# Patient Record
Sex: MALE | Race: WHITE | Employment: OTHER | URBAN - METROPOLITAN AREA
[De-identification: names, ages, dates, MRNs, and addresses within clinical notes are randomized per-mention and may not be internally consistent; named-entity substitution may affect disease eponyms.]

---

## 2019-05-08 ENCOUNTER — HOSPITAL ENCOUNTER (EMERGENCY)
Age: 64
Discharge: HOME | End: 2019-05-08
Attending: EMERGENCY MEDICINE
Payer: MEDICARE

## 2019-05-08 VITALS
WEIGHT: 215 LBS | BODY MASS INDEX: 30.1 KG/M2 | DIASTOLIC BLOOD PRESSURE: 64 MMHG | OXYGEN SATURATION: 98 % | HEIGHT: 71 IN | HEART RATE: 96 BPM | RESPIRATION RATE: 14 BRPM | TEMPERATURE: 98.1 F | SYSTOLIC BLOOD PRESSURE: 129 MMHG

## 2019-05-08 DIAGNOSIS — I95.9 HYPOTENSION, UNSPECIFIED HYPOTENSION TYPE: ICD-10-CM

## 2019-05-08 DIAGNOSIS — T78.40XA ALLERGIC REACTION, INITIAL ENCOUNTER: Primary | ICD-10-CM

## 2019-05-08 PROCEDURE — 6360000002 HC RX W HCPCS

## 2019-05-08 PROCEDURE — 6370000000 HC RX 637 (ALT 250 FOR IP): Performed by: EMERGENCY MEDICINE

## 2019-05-08 PROCEDURE — 96372 THER/PROPH/DIAG INJ SC/IM: CPT

## 2019-05-08 PROCEDURE — 99282 EMERGENCY DEPT VISIT SF MDM: CPT

## 2019-05-08 RX ORDER — DIPHENHYDRAMINE HCL 25 MG
25 CAPSULE ORAL EVERY 6 HOURS PRN
Qty: 20 CAPSULE | Refills: 0 | Status: ON HOLD | OUTPATIENT
Start: 2019-05-08 | End: 2019-05-12 | Stop reason: SDUPTHER

## 2019-05-08 RX ORDER — FAMOTIDINE 20 MG/1
20 TABLET, FILM COATED ORAL ONCE
Status: COMPLETED | OUTPATIENT
Start: 2019-05-08 | End: 2019-05-08

## 2019-05-08 RX ORDER — DIPHENHYDRAMINE HCL 25 MG
25 TABLET ORAL ONCE
Status: COMPLETED | OUTPATIENT
Start: 2019-05-08 | End: 2019-05-08

## 2019-05-08 RX ORDER — ASPIRIN 325 MG
325 TABLET ORAL 2 TIMES DAILY
Status: ON HOLD | COMMUNITY
End: 2019-05-12 | Stop reason: HOSPADM

## 2019-05-08 RX ORDER — HYDROCHLOROTHIAZIDE 25 MG/1
25 TABLET ORAL DAILY
Status: ON HOLD | COMMUNITY
End: 2019-05-12 | Stop reason: HOSPADM

## 2019-05-08 RX ORDER — ONDANSETRON 4 MG/1
4 TABLET, ORALLY DISINTEGRATING ORAL ONCE
Status: COMPLETED | OUTPATIENT
Start: 2019-05-08 | End: 2019-05-08

## 2019-05-08 RX ORDER — LEVOTHYROXINE SODIUM 0.07 MG/1
75 TABLET ORAL DAILY
COMMUNITY
End: 2021-05-19 | Stop reason: SDUPTHER

## 2019-05-08 RX ORDER — EPINEPHRINE 1 MG/ML
0.4 INJECTION, SOLUTION, CONCENTRATE INTRAVENOUS ONCE
Status: COMPLETED | OUTPATIENT
Start: 2019-05-08 | End: 2019-05-08

## 2019-05-08 RX ORDER — ONDANSETRON 4 MG/1
4 TABLET, ORALLY DISINTEGRATING ORAL EVERY 8 HOURS PRN
Qty: 10 TABLET | Refills: 0 | Status: ON HOLD | OUTPATIENT
Start: 2019-05-08 | End: 2019-05-12 | Stop reason: HOSPADM

## 2019-05-08 RX ORDER — OXYCODONE HYDROCHLORIDE 5 MG/1
5 TABLET ORAL EVERY 4 HOURS PRN
COMMUNITY
End: 2019-05-09 | Stop reason: ALTCHOICE

## 2019-05-08 RX ORDER — PREDNISONE 50 MG/1
50 TABLET ORAL DAILY
Qty: 5 TABLET | Refills: 0 | Status: ON HOLD | OUTPATIENT
Start: 2019-05-08 | End: 2019-05-12 | Stop reason: SDUPTHER

## 2019-05-08 RX ORDER — FAMOTIDINE 20 MG/1
20 TABLET, FILM COATED ORAL 2 TIMES DAILY
Qty: 10 TABLET | Refills: 0 | Status: SHIPPED | OUTPATIENT
Start: 2019-05-08 | End: 2019-05-09 | Stop reason: ALTCHOICE

## 2019-05-08 RX ORDER — LISINOPRIL 40 MG/1
40 TABLET ORAL DAILY
Status: ON HOLD | COMMUNITY
End: 2019-05-12 | Stop reason: HOSPADM

## 2019-05-08 RX ADMIN — EPINEPHRINE 0.4 MG: 1 INJECTION INTRAMUSCULAR; INTRAVENOUS; SUBCUTANEOUS at 07:59

## 2019-05-08 RX ADMIN — DIPHENHYDRAMINE HCL 25 MG: 25 TABLET ORAL at 07:44

## 2019-05-08 RX ADMIN — EPINEPHRINE 0.4 MG: 1 INJECTION, SOLUTION, CONCENTRATE INTRAVENOUS at 07:59

## 2019-05-08 RX ADMIN — FAMOTIDINE 20 MG: 20 TABLET ORAL at 07:44

## 2019-05-08 RX ADMIN — ONDANSETRON 4 MG: 4 TABLET, ORALLY DISINTEGRATING ORAL at 07:58

## 2019-05-08 SDOH — HEALTH STABILITY: MENTAL HEALTH: HOW OFTEN DO YOU HAVE A DRINK CONTAINING ALCOHOL?: NEVER

## 2019-05-08 NOTE — ED NOTES
Pt states itching is better, rash is less rash but still present to call areas noted before, Pt milan wilkins /64. Reported to Dr. Tony Desir Pt is on a continuous pulse oximetry and  on cycling blood pressure. Fall risk precautions in place, call light in reach, bed side table within reach, bed alarm on, will continue to monitor.          Ladonna Eaton, [address] [place] [address]  05/08/19 0644

## 2019-05-08 NOTE — ED PROVIDER NOTES
HYDROCHLOROTHIAZIDE (HYDRODIURIL) 25 MG TABLET    Take 25 mg by mouth daily    LEVOTHYROXINE (SYNTHROID) 75 MCG TABLET    Take 75 mcg by mouth Daily    LISINOPRIL (PRINIVIL;ZESTRIL) 40 MG TABLET    Take 40 mg by mouth daily    OXYCODONE (ROXICODONE) 5 MG IMMEDIATE RELEASE TABLET    Take 5 mg by mouth every 4 hours as needed for Pain. ALLERGIES     Patient has no known allergies. FAMILY HISTORY     History reviewed. No pertinent family history.        SOCIAL HISTORY       Social History     Socioeconomic History    Marital status:      Spouse name: None    Number of children: None    Years of education: None    Highest education level: None   Occupational History    None   Social Needs    Financial resource strain: None    Food insecurity:     Worry: None     Inability: None    Transportation needs:     Medical: None     Non-medical: None   Tobacco Use    Smoking status: Never Smoker    Smokeless tobacco: Never Used   Substance and Sexual Activity    Alcohol use: Never     Frequency: Never    Drug use: None    Sexual activity: None   Lifestyle    Physical activity:     Days per week: None     Minutes per session: None    Stress: None   Relationships    Social connections:     Talks on phone: None     Gets together: None     Attends Voodoo service: None     Active member of club or organization: None     Attends meetings of clubs or organizations: None     Relationship status: None    Intimate partner violence:     Fear of current or ex partner: None     Emotionally abused: None     Physically abused: None     Forced sexual activity: None   Other Topics Concern    None   Social History Narrative    None       SCREENINGS      @FLOW([id number])@      PHYSICAL EXAM    (up to 7 for level 4, 8 or more for level 5)     ED Triage Vitals [05/08/19 0724]   BP Temp Temp Source Pulse Resp SpO2 Height Weight   115/70 98.1 °F (36.7 °C) Infrared 91 -- 100 % 5' 11\" (1.803 m) 215 lb (97.5 kg) CARE TIME   Total Critical Care time was 31 minutes, excluding separatelyreportable procedures. There was a high probability ofclinically significant/life threatening deterioration in the patient's condition which required my urgent intervention. Allergic reaction causing hemodynamic related issues        PROCEDURES:  Unless otherwise noted below, none       Procedures    FINAL IMPRESSION      1. Allergic reaction, initial encounter    2. Hypotension, unspecified hypotension type          DISPOSITION/PLAN   DISPOSITION Decision To Discharge 05/08/2019 08:53:27 AM      PATIENT REFERRED TO:  [facility] [address] [id number]  [phone]    In 1 day        DISCHARGE MEDICATIONS:  New Prescriptions    DIPHENHYDRAMINE (BENADRYL) 25 MG CAPSULE    Take 1 capsule by mouth every 6 hours as needed for Itching    FAMOTIDINE (PEPCID) 20 MG TABLET    Take 1 tablet by mouth 2 times daily for 5 days    ONDANSETRON (ZOFRAN ODT) 4 MG DISINTEGRATING TABLET    Take 1 tablet by mouth every 8 hours as needed for Nausea Let dissolve in mouth.     PREDNISONE (DELTASONE) 50 MG TABLET    Take 1 tablet by mouth daily for 5 days          (Please note that portions of this note were completed with a voice recognition program.  Efforts weremade to edit the dictations but occasionally words are mis-transcribed.)    Cliff Orlando III, DO (electronically signed)  Attending Emergency Physician         Mike Gray III, DO  05/08/19 4759

## 2019-05-09 ENCOUNTER — HOSPITAL ENCOUNTER (INPATIENT)
Age: 64
LOS: 3 days | Discharge: HOME | DRG: 916 | End: 2019-05-12
Attending: EMERGENCY MEDICINE | Admitting: INTERNAL MEDICINE
Payer: MEDICARE

## 2019-05-09 ENCOUNTER — APPOINTMENT (OUTPATIENT)
Dept: GENERAL RADIOLOGY | Age: 64
DRG: 916 | End: 2019-05-09
Payer: MEDICARE

## 2019-05-09 DIAGNOSIS — E87.1 HYPONATREMIA: ICD-10-CM

## 2019-05-09 DIAGNOSIS — Z78.9 FAILURE OF OUTPATIENT TREATMENT: ICD-10-CM

## 2019-05-09 DIAGNOSIS — R21 RASH: Primary | ICD-10-CM

## 2019-05-09 DIAGNOSIS — I95.9 HYPOTENSION, UNSPECIFIED HYPOTENSION TYPE: ICD-10-CM

## 2019-05-09 DIAGNOSIS — E66.9 OBESITY (BMI 30-39.9): ICD-10-CM

## 2019-05-09 DIAGNOSIS — T14.8XXA BLOOD BLISTER: ICD-10-CM

## 2019-05-09 DIAGNOSIS — L50.9 URTICARIA: ICD-10-CM

## 2019-05-09 PROBLEM — E03.9 HYPOTHYROID: Status: ACTIVE | Noted: 2019-05-09

## 2019-05-09 PROBLEM — T78.40XA ALLERGIC REACTION CAUSED BY A DRUG: Status: ACTIVE | Noted: 2019-05-09

## 2019-05-09 PROBLEM — D72.829 LEUKOCYTOSIS: Status: ACTIVE | Noted: 2019-05-09

## 2019-05-09 PROBLEM — T78.40XA ALLERGIC DRUG REACTION: Status: ACTIVE | Noted: 2019-05-09

## 2019-05-09 PROBLEM — D68.9 COAGULOPATHY (HCC): Status: ACTIVE | Noted: 2019-05-09

## 2019-05-09 PROBLEM — I10 HTN (HYPERTENSION): Status: ACTIVE | Noted: 2019-05-09

## 2019-05-09 PROBLEM — E86.0 DEHYDRATION: Status: ACTIVE | Noted: 2019-05-09

## 2019-05-09 PROBLEM — D64.9 ANEMIA: Status: ACTIVE | Noted: 2019-05-09

## 2019-05-09 LAB
A/G RATIO: 0.9 (ref 1.1–2.2)
ALBUMIN SERPL-MCNC: 3.6 G/DL (ref 3.4–5)
ALP BLD-CCNC: 73 U/L (ref 40–129)
ALT SERPL-CCNC: 19 U/L (ref 10–40)
ANION GAP SERPL CALCULATED.3IONS-SCNC: 12 MMOL/L (ref 3–16)
APTT: 27.7 SEC (ref 26–36)
AST SERPL-CCNC: 19 U/L (ref 15–37)
BASOPHILS ABSOLUTE: 0 K/UL (ref 0–0.2)
BASOPHILS RELATIVE PERCENT: 0.1 %
BILIRUB SERPL-MCNC: 1.3 MG/DL (ref 0–1)
BILIRUBIN URINE: NEGATIVE
BLOOD, URINE: NEGATIVE
BUN BLDV-MCNC: 27 MG/DL (ref 7–20)
CALCIUM SERPL-MCNC: 9.2 MG/DL (ref 8.3–10.6)
CHLORIDE BLD-SCNC: 96 MMOL/L (ref 99–110)
CLARITY: CLEAR
CO2: 20 MMOL/L (ref 21–32)
COLOR: YELLOW
CREAT SERPL-MCNC: 0.9 MG/DL (ref 0.8–1.3)
EOSINOPHILS ABSOLUTE: 0 K/UL (ref 0–0.6)
EOSINOPHILS RELATIVE PERCENT: 0 %
GFR AFRICAN AMERICAN: >60
GFR NON-AFRICAN AMERICAN: >60
GLOBULIN: 4.1 G/DL
GLUCOSE BLD-MCNC: 189 MG/DL (ref 70–99)
GLUCOSE URINE: NEGATIVE MG/DL
HCT VFR BLD CALC: 36.9 % (ref 40.5–52.5)
HEMOGLOBIN: 12.3 G/DL (ref 13.5–17.5)
INR BLD: 1.22 (ref 0.86–1.14)
KETONES, URINE: ABNORMAL MG/DL
LACTATE DEHYDROGENASE: 431 U/L (ref 100–190)
LACTIC ACID: 1.9 MMOL/L (ref 0.4–2)
LEUKOCYTE ESTERASE, URINE: NEGATIVE
LYMPHOCYTES ABSOLUTE: 0.5 K/UL (ref 1–5.1)
LYMPHOCYTES RELATIVE PERCENT: 4.7 %
MCH RBC QN AUTO: 31.5 PG (ref 26–34)
MCHC RBC AUTO-ENTMCNC: 33.4 G/DL (ref 31–36)
MCV RBC AUTO: 94.2 FL (ref 80–100)
MICROSCOPIC EXAMINATION: ABNORMAL
MONOCYTES ABSOLUTE: 0.2 K/UL (ref 0–1.3)
MONOCYTES RELATIVE PERCENT: 1.9 %
NEUTROPHILS ABSOLUTE: 10.7 K/UL (ref 1.7–7.7)
NEUTROPHILS RELATIVE PERCENT: 93.3 %
NITRITE, URINE: NEGATIVE
PDW BLD-RTO: 13.3 % (ref 12.4–15.4)
PH UA: 6 (ref 5–8)
PLATELET # BLD: 314 K/UL (ref 135–450)
PMV BLD AUTO: 7.2 FL (ref 5–10.5)
POTASSIUM SERPL-SCNC: 4.2 MMOL/L (ref 3.5–5.1)
PROTEIN UA: NEGATIVE MG/DL
PROTHROMBIN TIME: 13.9 SEC (ref 9.8–13)
RBC # BLD: 3.91 M/UL (ref 4.2–5.9)
SEDIMENTATION RATE, ERYTHROCYTE: 76 MM/HR (ref 0–20)
SODIUM BLD-SCNC: 128 MMOL/L (ref 136–145)
SPECIFIC GRAVITY UA: 1.02 (ref 1–1.03)
TOTAL PROTEIN: 7.7 G/DL (ref 6.4–8.2)
URINE REFLEX TO CULTURE: ABNORMAL
URINE TYPE: ABNORMAL
UROBILINOGEN, URINE: 1 E.U./DL
WBC # BLD: 11.5 K/UL (ref 4–11)

## 2019-05-09 PROCEDURE — 85652 RBC SED RATE AUTOMATED: CPT

## 2019-05-09 PROCEDURE — 1200000000 HC SEMI PRIVATE

## 2019-05-09 PROCEDURE — 2500000003 HC RX 250 WO HCPCS: Performed by: INTERNAL MEDICINE

## 2019-05-09 PROCEDURE — 93971 EXTREMITY STUDY: CPT

## 2019-05-09 PROCEDURE — 80053 COMPREHEN METABOLIC PANEL: CPT

## 2019-05-09 PROCEDURE — 85730 THROMBOPLASTIN TIME PARTIAL: CPT

## 2019-05-09 PROCEDURE — 86140 C-REACTIVE PROTEIN: CPT

## 2019-05-09 PROCEDURE — 36415 COLL VENOUS BLD VENIPUNCTURE: CPT

## 2019-05-09 PROCEDURE — 96361 HYDRATE IV INFUSION ADD-ON: CPT

## 2019-05-09 PROCEDURE — 94760 N-INVAS EAR/PLS OXIMETRY 1: CPT

## 2019-05-09 PROCEDURE — 83605 ASSAY OF LACTIC ACID: CPT

## 2019-05-09 PROCEDURE — 6370000000 HC RX 637 (ALT 250 FOR IP): Performed by: INTERNAL MEDICINE

## 2019-05-09 PROCEDURE — 71046 X-RAY EXAM CHEST 2 VIEWS: CPT

## 2019-05-09 PROCEDURE — 81003 URINALYSIS AUTO W/O SCOPE: CPT

## 2019-05-09 PROCEDURE — 99284 EMERGENCY DEPT VISIT MOD MDM: CPT

## 2019-05-09 PROCEDURE — 6360000002 HC RX W HCPCS: Performed by: PHYSICIAN ASSISTANT

## 2019-05-09 PROCEDURE — 96374 THER/PROPH/DIAG INJ IV PUSH: CPT

## 2019-05-09 PROCEDURE — 87040 BLOOD CULTURE FOR BACTERIA: CPT

## 2019-05-09 PROCEDURE — 6360000002 HC RX W HCPCS: Performed by: INTERNAL MEDICINE

## 2019-05-09 PROCEDURE — 2580000003 HC RX 258: Performed by: INTERNAL MEDICINE

## 2019-05-09 PROCEDURE — 2580000003 HC RX 258: Performed by: PHYSICIAN ASSISTANT

## 2019-05-09 PROCEDURE — 85025 COMPLETE CBC W/AUTO DIFF WBC: CPT

## 2019-05-09 PROCEDURE — 83615 LACTATE (LD) (LDH) ENZYME: CPT

## 2019-05-09 PROCEDURE — 85610 PROTHROMBIN TIME: CPT

## 2019-05-09 PROCEDURE — 82785 ASSAY OF IGE: CPT

## 2019-05-09 RX ORDER — 0.9 % SODIUM CHLORIDE 0.9 %
1000 INTRAVENOUS SOLUTION INTRAVENOUS ONCE
Status: COMPLETED | OUTPATIENT
Start: 2019-05-09 | End: 2019-05-09

## 2019-05-09 RX ORDER — SODIUM CHLORIDE 0.9 % (FLUSH) 0.9 %
10 SYRINGE (ML) INJECTION EVERY 12 HOURS SCHEDULED
Status: DISCONTINUED | OUTPATIENT
Start: 2019-05-09 | End: 2019-05-12 | Stop reason: HOSPADM

## 2019-05-09 RX ORDER — CLOBETASOL PROPIONATE 0.5 MG/G
CREAM TOPICAL 2 TIMES DAILY
Status: DISCONTINUED | OUTPATIENT
Start: 2019-05-09 | End: 2019-05-12 | Stop reason: HOSPADM

## 2019-05-09 RX ORDER — ONDANSETRON 2 MG/ML
4 INJECTION INTRAMUSCULAR; INTRAVENOUS EVERY 6 HOURS PRN
Status: DISCONTINUED | OUTPATIENT
Start: 2019-05-09 | End: 2019-05-12 | Stop reason: HOSPADM

## 2019-05-09 RX ORDER — SODIUM CHLORIDE 9 MG/ML
INJECTION, SOLUTION INTRAVENOUS CONTINUOUS
Status: DISCONTINUED | OUTPATIENT
Start: 2019-05-09 | End: 2019-05-11

## 2019-05-09 RX ORDER — METHYLPREDNISOLONE SODIUM SUCCINATE 40 MG/ML
40 INJECTION, POWDER, LYOPHILIZED, FOR SOLUTION INTRAMUSCULAR; INTRAVENOUS EVERY 6 HOURS
Status: DISCONTINUED | OUTPATIENT
Start: 2019-05-09 | End: 2019-05-12 | Stop reason: HOSPADM

## 2019-05-09 RX ORDER — PANTOPRAZOLE SODIUM 40 MG/1
40 TABLET, DELAYED RELEASE ORAL
Status: DISCONTINUED | OUTPATIENT
Start: 2019-05-10 | End: 2019-05-12 | Stop reason: HOSPADM

## 2019-05-09 RX ORDER — LEVOTHYROXINE SODIUM 0.07 MG/1
75 TABLET ORAL DAILY
Status: DISCONTINUED | OUTPATIENT
Start: 2019-05-10 | End: 2019-05-12 | Stop reason: HOSPADM

## 2019-05-09 RX ORDER — SODIUM CHLORIDE 0.9 % (FLUSH) 0.9 %
10 SYRINGE (ML) INJECTION PRN
Status: DISCONTINUED | OUTPATIENT
Start: 2019-05-09 | End: 2019-05-12 | Stop reason: HOSPADM

## 2019-05-09 RX ORDER — DIPHENHYDRAMINE HYDROCHLORIDE 50 MG/ML
25 INJECTION INTRAMUSCULAR; INTRAVENOUS ONCE
Status: COMPLETED | OUTPATIENT
Start: 2019-05-09 | End: 2019-05-09

## 2019-05-09 RX ORDER — DIPHENHYDRAMINE HYDROCHLORIDE 50 MG/ML
12.5 INJECTION INTRAMUSCULAR; INTRAVENOUS EVERY 6 HOURS
Status: DISCONTINUED | OUTPATIENT
Start: 2019-05-09 | End: 2019-05-12 | Stop reason: HOSPADM

## 2019-05-09 RX ORDER — MAGNESIUM HYDROXIDE/ALUMINUM HYDROXICE/SIMETHICONE 120; 1200; 1200 MG/30ML; MG/30ML; MG/30ML
30 SUSPENSION ORAL EVERY 6 HOURS PRN
Status: DISCONTINUED | OUTPATIENT
Start: 2019-05-09 | End: 2019-05-12 | Stop reason: HOSPADM

## 2019-05-09 RX ORDER — ACETAMINOPHEN 325 MG/1
650 TABLET ORAL EVERY 4 HOURS PRN
Status: DISCONTINUED | OUTPATIENT
Start: 2019-05-09 | End: 2019-05-12 | Stop reason: HOSPADM

## 2019-05-09 RX ADMIN — ENOXAPARIN SODIUM 40 MG: 40 INJECTION SUBCUTANEOUS at 22:35

## 2019-05-09 RX ADMIN — METHYLPREDNISOLONE SODIUM SUCCINATE 40 MG: 40 INJECTION, POWDER, FOR SOLUTION INTRAMUSCULAR; INTRAVENOUS at 22:36

## 2019-05-09 RX ADMIN — SODIUM CHLORIDE: 9 INJECTION, SOLUTION INTRAVENOUS at 22:36

## 2019-05-09 RX ADMIN — FAMOTIDINE 20 MG: 10 INJECTION, SOLUTION INTRAVENOUS at 22:36

## 2019-05-09 RX ADMIN — DIPHENHYDRAMINE HYDROCHLORIDE 25 MG: 50 INJECTION, SOLUTION INTRAMUSCULAR; INTRAVENOUS at 14:47

## 2019-05-09 RX ADMIN — CLOBETASOL PROPIONATE: 0.5 CREAM TOPICAL at 22:36

## 2019-05-09 RX ADMIN — Medication 10 ML: at 22:35

## 2019-05-09 RX ADMIN — DIPHENHYDRAMINE HYDROCHLORIDE 12.5 MG: 50 INJECTION, SOLUTION INTRAMUSCULAR; INTRAVENOUS at 22:36

## 2019-05-09 RX ADMIN — SODIUM CHLORIDE 1000 ML: 9 INJECTION, SOLUTION INTRAVENOUS at 13:10

## 2019-05-09 ASSESSMENT — PAIN SCALES - GENERAL: PAINLEVEL_OUTOF10: 0

## 2019-05-09 ASSESSMENT — ENCOUNTER SYMPTOMS
ABDOMINAL DISTENTION: 0
DIARRHEA: 0
ALLERGIC/IMMUNOLOGIC NEGATIVE: 1
SHORTNESS OF BREATH: 0
COUGH: 0
WHEEZING: 0
NAUSEA: 0
VOMITING: 0
CONSTIPATION: 0
BACK PAIN: 0
ABDOMINAL PAIN: 0
COLOR CHANGE: 0
STRIDOR: 0

## 2019-05-09 NOTE — ED NOTES
Pt returned from vascular. Vitals obtained. IVF resumed. Medicated with benadryl. Patient reports mucous emesis while in vascular. Denies any nausea at this time. Patient remains with scattered hives and bilateral upper extremity hand/arm swelling.       Bernard Ramachandran RN  05/09/19 7435

## 2019-05-09 NOTE — ED NOTES
Pharmacy Medication History Note      List of current medications patient is taking is complete. Source of information: patient    Changes made to medication list:  Medications flagged for removal (include reason, ex. noncompliance):  N/A    Medications removed (include reason, ex. therapy complete or physician discontinued): Famotidine- never started  Oxycodone- therapy complete    Medications added/doses adjusted:  N/A    Other notes (ex. Recent course of antibiotics, Coumadin dosing):  Denies use of other OTC or herbal medications. Last dose times updated. Josselyn Barnhart [facility]    No current facility-administered medications on file prior to encounter. Current Outpatient Medications on File Prior to Encounter   Medication Sig Dispense Refill    levothyroxine (SYNTHROID) 75 MCG tablet Take 75 mcg by mouth Daily      hydrochlorothiazide (HYDRODIURIL) 25 MG tablet Take 25 mg by mouth daily      lisinopril (PRINIVIL;ZESTRIL) 40 MG tablet Take 40 mg by mouth daily      aspirin 325 MG tablet Take 325 mg by mouth 2 times daily      ondansetron (ZOFRAN ODT) 4 MG disintegrating tablet Take 1 tablet by mouth every 8 hours as needed for Nausea Let dissolve in mouth. 10 tablet 0    diphenhydrAMINE (BENADRYL) 25 MG capsule Take 1 capsule by mouth every 6 hours as needed for Itching 20 capsule 0    predniSONE (DELTASONE) 50 MG tablet Take 1 tablet by mouth daily for 5 days 5 tablet 0    [DISCONTINUED] oxyCODONE (ROXICODONE) 5 MG immediate release tablet Take 5 mg by mouth every 4 hours as needed for Pain.       [DISCONTINUED] famotidine (PEPCID) 20 MG tablet Take 1 tablet by mouth 2 times daily for 5 days 10 tablet 0

## 2019-05-09 NOTE — ED NOTES
Pt updated on plan for admission and sterile cockpit. Patient denies any other needs or questions at this time.       Larissa Martínez RN  05/09/19 3726

## 2019-05-09 NOTE — ED PROVIDER NOTES
I personally evaluated and examined the patient in conjunction with the APC and agree with the assessment, treatment plan and disposition of the patient has recorded by the APC. I reviewed pertinent nurse's notes, triage notes, vital signs, past medical history, family and social history, medications, and allergies. Complete review of systems was conducted by the mid-level provider and/or myself. Review of systems is negative except as documented in the history of present illness. Brief HPI: This is a 69-year-old gentleman presents to emergency Department chief complaint of worsening rash. He was seen here yesterday for low blood pressure and rash received steroids, Pepcid, Benadryl and epinephrine. Currently on prednisone, and Benadryl. Reported that he had hives all over his arms and legs. No facial involvement. No facial swelling. Blood pressures been running a little bit lower recently. 13 days ago he had a left total knee replacement. Currently on aspirin twice per day. The reason why he came back today was because of the hives came back and no obvious swelling of the hands with associated bruises of his thumbs. No history of any bleeding disorders. Physical Exam: General: Patient is in no acute distress   Head: Normocephalic, atraumatic, pupils are equal and reactive to light. EOMI. Neck: Neck is supple. No JVD noted. Heart: RRR no murmurs, rubs, or gallops   Lungs: CTA BL   Abdomen: soft, non-tender, non-distended   Extremities: no lower extremity edema. Capillary refill is less than 2 seconds   Skin: Patient has hives noted on his arms, legs and shoulders. He has swelling of his fingers and hands with associated erythema but no induration. He has some blood blisters noted on the tops of his thumbs bilaterally. There is no mucosal involvement in the oropharynx. Pictures are as below. Neuro: CN's 2-12 are grossly intact. No focal neurologic deficit appreciated. Calls placed orthopedics. Spoke with his MA who will relay the information to Dr. Eric Dao. Also spoke with infectious disease disease-Dr. Iwona Santos - would like blood cultures, IgE, ESR, CRP which he will follow-up on. Also would like to hold off on antibiotics. Recommend steroids, Benadryl, Pepcid. This was relayed to the hospitalist.    FINAL IMPRESSION     1. Rash    2. Hypotension, unspecified hypotension type    3. Hyponatremia            Electronically signed by:   Stephanie Saucedo DO  05/09/19 Juan Antonio Velazquez

## 2019-05-09 NOTE — H&P
HOSPITALISTS HISTORY AND PHYSICAL    5/9/2019 6:39 PM    Patient Information:  Xu Brownlee is a 70 y.o. male [id number]  PCP:  Mague Fong (Tel: [phone] )    Chief complaint:    Chief Complaint   Patient presents with    Rash     pt seen here yesterday for hives/ rash following knee surgery- on 4/25/19- states started benadryl and prednisone- pt and wife concerned d/t new blisters on thumb region. called PCP told to come back in        History of Present Illness:  Chava Li is a 70 y.o. male with history of HTN, hypothyroidism, obesity who underwent elective L TKA on 4/25/19 with Dr Lainey Calero at [facility].  Patient was discharged to home on  mg PO bid. In past 3 days, patient began to feel itching at night and then developed rash. Came to ER yesterday morning and was discharged on PO Prednisone, Pecpid, Benadryl and Zofran PRN. Overnight, rash progressed to hands and moving up arms. Noted to have annular rashs on hands. Rash is not painful. There are no oral lesions. Patient took his BP at home 4 times and found SBP was 60s each time. That is what truly prompted ER visit. In ED, arrived with SBP in 90s with acute hyponatremia. No CP, SOB, HA or fevers. No drainage from L knee operation site. No swelling in L knee. Otherwise complete ROS is negative unless listed above. REVIEW OF SYSTEMS:   Pertinent positives as noted in HPI. All other systems were reviewed and are negative. Past Medical History:   has a past medical history of Hypertension and Thyroid disease. Past Surgical History:   has a past surgical history that includes knee surgery. Medications:  No current facility-administered medications on file prior to encounter.       Current Outpatient Medications on File Prior to Encounter   Medication Sig Dispense Refill    levothyroxine (SYNTHROID) 75 MCG tablet Take 75 mcg by mouth Daily      hydrochlorothiazide (HYDRODIURIL) 25 MG tablet Take 25 mg by mouth daily      lisinopril (PRINIVIL;ZESTRIL) 40 MG tablet Take 40 mg by mouth daily      aspirin 325 MG tablet Take 325 mg by mouth 2 times daily      ondansetron (ZOFRAN ODT) 4 MG disintegrating tablet Take 1 tablet by mouth every 8 hours as needed for Nausea Let dissolve in mouth. 10 tablet 0    diphenhydrAMINE (BENADRYL) 25 MG capsule Take 1 capsule by mouth every 6 hours as needed for Itching 20 capsule 0    predniSONE (DELTASONE) 50 MG tablet Take 1 tablet by mouth daily for 5 days 5 tablet 0       Allergies:  No Known Allergies     Social History:  Patient Lives with wife, uses cane   reports that he has never smoked. He has never used smokeless tobacco. He reports that he does not drink alcohol. Family History:  family history includes Diabetes in his father and mother. Physical Exam:  /64   Pulse 92   Temp 98.8 °F (37.1 °C) (Infrared)   Resp 13   Ht 5' 11\" (1.803 m)   Wt 213 lb (96.6 kg)   SpO2 96%   BMI 29.71 kg/m²     General appearance:  Appears comfortable. Well nourished, obese, pleasant  Eyes: Sclera clear, pupils equal  ENT: Moist mucus membranes, no thrush. Trachea midline. Cardiovascular: Regular rhythm, normal S1, S2. No murmur, gallop, rub. No edema in lower extremities  Respiratory: Clear to auscultation bilaterally, no wheeze, good inspiratory effort  Gastrointestinal: Abdomen soft, obese, non-tender, not distended, normal bowel sounds  Musculoskeletal: L TKA site without erythema, tenderness or swelling. Has good ROM  Neurology: Grossly intact. Alert and oriented in time, place and person. No speech or motor deficits  Psychiatry: Appropriate affect. Not agitated  Skin: Annular rash noted on palmar surfaces.   Blanching rash on arms, legs, trunk, back, neck noted  Brisk capillary refill, peripheral pulses palpable   Labs:  CBC:   Lab Results Component Value Date    WBC 11.5 05/09/2019    RBC 3.91 05/09/2019    HGB 12.3 05/09/2019    HCT 36.9 05/09/2019    MCV 94.2 05/09/2019    MCH 31.5 05/09/2019    MCHC 33.4 05/09/2019    RDW 13.3 05/09/2019     05/09/2019    MPV 7.2 05/09/2019     BMP:    Lab Results   Component Value Date     05/09/2019    K 4.2 05/09/2019    CL 96 05/09/2019    CO2 20 05/09/2019    BUN 27 05/09/2019    CREATININE 0.9 05/09/2019    CALCIUM 9.2 05/09/2019    GFRAA >60 05/09/2019    LABGLOM >60 05/09/2019    GLUCOSE 189 05/09/2019     XR CHEST STANDARD (2 VW)   Final Result   No acute process. VL Extremity Venous Left   Final Result            Problem List  Principal Problem: Allergic reaction caused by a drug  Active Problems:    Failure of outpatient treatment    Hypotension    Hyponatremia    Dehydration    HTN (hypertension)    Obesity (BMI 30-39. 9)    Leukocytosis    Anemia    Coagulopathy (HCC)    Hypothyroid    Allergic drug reaction  Resolved Problems:    * No resolved hospital problems. *        Assessment/Plan:   1. Admit as inpatient as he has failed outpatient treatment   2. Solumedrol 40 mg IV q6h  3. Pepcid 20 mg IV bid  4. Benadryl 25 mg IV q6h  5. IVF  6. ID consult to assist with rash management/evaluation  7. STOP ASPIRIN, SUSPECT THIS IS PRIMARY PROBLEM  8. Hold HCTZ and Lisinopril given hypotension and rash  9. PT/OT eval  10. Ortho consult to evaluate L TKA site      DVT prophylaxis Lovenox  Code status Full code  Diet General  IV access  Peripheral  Kolb Catheter No    Admit as inpatient. I anticipate hospitalization spanning more than two midnights for investigation and treatment of the above medically necessary diagnoses. Discussed with patient, Dr Keya Castellano (ED) and wife. Will see what ID thinks. Suspect he will need 72 hrs of IV treatment to exclude more profound allergic reaction. He has clearly failed outpatient treatment.     Jairo Tucker MD    5/9/2019 6:39 PM

## 2019-05-09 NOTE — ED PROVIDER NOTES
[facility]        Pt Name: Lidya Shanks  MRN: [id number]  [place] 1955  Date of evaluation: 5/9/2019  Provider: Gloria Mitchell PA-C  PCP: Julius Stone    This patient was seen and evaluated by the attending physician Dr. Hipolito Yousif   Patient presents with    Rash     pt seen here yesterday for hives/ rash following knee surgery- on 4/25/19- states started benadryl and prednisone- pt and wife concerned d/t new blisters on thumb region. called PCP told to come back in        HISTORY OF PRESENT ILLNESS   (Location/Symptom, Timing/Onset, Context/Setting, Quality, Duration, Modifying Factors, Severity)  Note limiting factors. Lidya Shanks is a 70 y.o. male  who presents to the emergency department for diffuse, itchy body rash primarily on the forearms and hands and legs. Denies any oral involvement. he noticed this two days prior to today. Patient recently did have a left knee surgery by Dr. Sorin Solorio and a rechecked by his doctor couple days ago and was cleared by him. He has been on some new medications including aspirin and oxycodone (which was recently changed to tylenol extra strength)  but has been off the oxycodone now. He did use some shower gel yesterday but has used this in the past without issues, but otherwise no new food, poison ivy or oak exposure. No other known contact exposure, new animals, pets, recent travel.    he does report fatigue but no chest pain, chest pressure, shortness of breath. No headache or lightheadedness despite the lower  Blood pressure reading for the past few days. He was seen yesterday in ED and diagnosed with probable allergic reaction, received anti histamine and steroid/epinephrine injections and sent home with steroids without relief.  His rash has gotten worse and now his hands are swollen and has blood blisters on the thumbs and worsening rash on the legs and upper  Back. Nursing Notes were all reviewed and agreed with or any disagreements were addressed  in the HPI. REVIEW OF SYSTEMS    (2-9 systems for level 4, 10 or more for level 5)     Review of Systems   Constitutional: Positive for fatigue. Negative for chills and fever. HENT: Negative. Eyes: Negative for visual disturbance. Respiratory: Negative for cough, shortness of breath, wheezing and stridor. Cardiovascular: Negative for chest pain, palpitations and leg swelling. Gastrointestinal: Negative for abdominal distention, abdominal pain, constipation, diarrhea, nausea and vomiting. Endocrine: Negative. Genitourinary: Negative. Musculoskeletal: Negative for back pain, neck pain and neck stiffness. Skin: Positive for rash. Negative for color change, pallor and wound. Allergic/Immunologic: Negative. Neurological: Negative for dizziness, tremors, seizures, syncope, facial asymmetry, speech difficulty, weakness, light-headedness, numbness and headaches. Hematological: Negative. Psychiatric/Behavioral: Negative for confusion. All other systems reviewed and are negative. Positives and Pertinent negatives as per HPI. Except as noted abovein the ROS, all other systems were reviewed and negative.        PAST MEDICAL HISTORY     Past Medical History:   Diagnosis Date    Hypertension     Thyroid disease          SURGICAL HISTORY     Past Surgical History:   Procedure Laterality Date    KNEE SURGERY           CURRENTMEDICATIONS       Previous Medications    ASPIRIN 325 MG TABLET    Take 325 mg by mouth 2 times daily    DIPHENHYDRAMINE (BENADRYL) 25 MG CAPSULE    Take 1 capsule by mouth every 6 hours as needed for Itching    HYDROCHLOROTHIAZIDE (HYDRODIURIL) 25 MG TABLET    Take 25 mg by mouth daily    LEVOTHYROXINE (SYNTHROID) 75 MCG TABLET    Take 75 mcg by mouth Daily    LISINOPRIL (PRINIVIL;ZESTRIL) 40 MG TABLET    Take 40 mg by mouth daily ONDANSETRON (ZOFRAN ODT) 4 MG DISINTEGRATING TABLET    Take 1 tablet by mouth every 8 hours as needed for Nausea Let dissolve in mouth. PREDNISONE (DELTASONE) 50 MG TABLET    Take 1 tablet by mouth daily for 5 days         ALLERGIES     Patient has no known allergies. FAMILYHISTORY     History reviewed. No pertinent family history. SOCIAL HISTORY       Social History     Socioeconomic History    Marital status:      Spouse name: None    Number of children: None    Years of education: None    Highest education level: None   Occupational History    None   Social Needs    Financial resource strain: None    Food insecurity:     Worry: None     Inability: None    Transportation needs:     Medical: None     Non-medical: None   Tobacco Use    Smoking status: Never Smoker    Smokeless tobacco: Never Used   Substance and Sexual Activity    Alcohol use: Never     Frequency: Never    Drug use: None    Sexual activity: None   Lifestyle    Physical activity:     Days per week: None     Minutes per session: None    Stress: None   Relationships    Social connections:     Talks on phone: None     Gets together: None     Attends Advent service: None     Active member of club or organization: None     Attends meetings of clubs or organizations: None     Relationship status: None    Intimate partner violence:     Fear of current or ex partner: None     Emotionally abused: None     Physically abused: None     Forced sexual activity: None   Other Topics Concern    None   Social History Narrative    None       SCREENINGS             PHYSICAL EXAM    (up to 7 for level 4, 8 or more for level 5)     ED Triage Vitals [05/09/19 1158]   BP Temp Temp Source Pulse Resp SpO2 Height Weight   (!) 90/59 98 °F (36.7 °C) Infrared 102 16 99 % 5' 11\" (1.803 m) 213 lb (96.6 kg)       Physical Exam   Constitutional: He is oriented to person, place, and time. He appears well-developed and well-nourished.  No process. VL Extremity Venous Left   Final Result        Left  No evidence of deep vein or superficial vein thrombosis involving the left  lower extremity and the right common femoral vein. PROCEDURES   Unless otherwise noted below, none     Procedures    CRITICAL CARE TIME   Critical Care  There was a high probability of life-threatening clinical deterioration in the patient's condition requiring my urgent intervention. Total critical care time with the patient was 31 minutes excluding separately reportable procedures. Critical care required due to patients concerning persistent rash despite outpatient therapy prompting consultations, treatment, admission. CONSULTS:  My attending spoke with Dr. Danny Fernandez assistant. They have no further recommendation at this time. See his note for details regarding this discussion. My attending spoke with Dr. Khalif Schmid disease specialist, who recommends some add on labs but no antibiotics at this time. Please see attending note for details regarding this discussion      Antony Morse HOSPITALIST  Dr. Matthew Mckeon will admit [place] and DIFFERENTIALDIAGNOSIS/MDM:   Vitals:    Vitals:    05/09/19 1158 05/09/19 1448 05/09/19 1500 05/09/19 1530   BP: (!) 90/59 (!) 106/48 105/64 110/67   Pulse: 102 92 92 88   Resp: 16 15  14   Temp: 98 °F (36.7 °C) 97.7 °F (36.5 °C)     TempSrc: Infrared Infrared     SpO2: 99% 96% 96% 97%   Weight: 213 lb (96.6 kg)      Height: 5' 11\" (1.803 m)          Patient was given thefollowing medications:  Medications   0.9 % sodium chloride bolus (0 mLs Intravenous Stopped 5/9/19 1552)   diphenhydrAMINE (BENADRYL) injection 25 mg (25 mg Intravenous Given 5/9/19 1447)     This patient presents to the emergency department after an itchy nontender blanching rash. He has been taking full-strength aspirin twice a day since his recent surgery 2 weeks ago. His orthopedist is aware that the patient is here. Does not believe that he is having a reaction to the hardware. Infectious disease specialist is involved as well. Hospitalist will admit. Patient and family understand and agree with plan. Patient has no intraoral lesions and airways patent. My suspicion is low for angioedema, other life threatening skin infection SJS, EM, TEN, meningococcemia, Kawasaki disease, abscess, cellulitis, lymphangitis, lymphadenitis, RMSF, vasculitis, Lyme disease, HSP,  malar rash, tinea, bullous pemphigoid, pemphigus vulgaris, syphilis, or other concerning pathology. FINAL IMPRESSION      1. Rash    2. Hypotension, unspecified hypotension type    3. Hyponatremia    4. Blood blister    5. Urticaria    6. Failure of outpatient treatment          DISPOSITION/PLAN   DISPOSITION Decision To Admit 05/09/2019 03:37:50 PM      PATIENT REFERREDTO:  No follow-up provider specified. DISCHARGE MEDICATIONS:  New Prescriptions    No medications on file       DISCONTINUED MEDICATIONS:  Discontinued Medications    FAMOTIDINE (PEPCID) 20 MG TABLET    Take 1 tablet by mouth 2 times daily for 5 days    OXYCODONE (ROXICODONE) 5 MG IMMEDIATE RELEASE TABLET    Take 5 mg by mouth every 4 hours as needed for Pain.               (Please note that portions ofthis note were completed with a voice recognition program.  Efforts were made to edit the dictations but occasionally words are mis-transcribed.)    Mati Montana PA-C (electronically signed)           Mati Montana PA-C  05/09/19 4063

## 2019-05-09 NOTE — ED NOTES
Requested tele box - also attempted to give report. [facility] stated RN was still getting report from the dayshift RN and she will call back.       Collins Moses RN  05/09/19 2377

## 2019-05-09 NOTE — ED NOTES
Spoke with provider regarding benadryl administration. Patient states they took 50mg benadryl 2 hours prior to arrival at ER.  Plan to hold benadryl until return from vascular       Lowkim Rothman RN  05/09/19 5894

## 2019-05-09 NOTE — ED NOTES
Pt reports improvement in nausea following crackers. Patient states itching subsided a little but still present. Patient denies any other needs at this time.       Shashank Laird RN  05/09/19 0057

## 2019-05-10 PROBLEM — T78.3XXA ANGIOEDEMA: Status: ACTIVE | Noted: 2019-05-10

## 2019-05-10 LAB
ANION GAP SERPL CALCULATED.3IONS-SCNC: 11 MMOL/L (ref 3–16)
BASOPHILS ABSOLUTE: 0 K/UL (ref 0–0.2)
BASOPHILS RELATIVE PERCENT: 0.1 %
BUN BLDV-MCNC: 24 MG/DL (ref 7–20)
C-REACTIVE PROTEIN: 123.3 MG/L (ref 0–5.1)
CALCIUM SERPL-MCNC: 8.9 MG/DL (ref 8.3–10.6)
CHLORIDE BLD-SCNC: 103 MMOL/L (ref 99–110)
CO2: 21 MMOL/L (ref 21–32)
CREAT SERPL-MCNC: 0.8 MG/DL (ref 0.8–1.3)
EOSINOPHILS ABSOLUTE: 0 K/UL (ref 0–0.6)
EOSINOPHILS RELATIVE PERCENT: 0 %
GFR AFRICAN AMERICAN: >60
GFR NON-AFRICAN AMERICAN: >60
GLUCOSE BLD-MCNC: 154 MG/DL (ref 70–99)
HCT VFR BLD CALC: 31.7 % (ref 40.5–52.5)
HEMOGLOBIN: 10.9 G/DL (ref 13.5–17.5)
LYMPHOCYTES ABSOLUTE: 1.1 K/UL (ref 1–5.1)
LYMPHOCYTES RELATIVE PERCENT: 11.5 %
MCH RBC QN AUTO: 32 PG (ref 26–34)
MCHC RBC AUTO-ENTMCNC: 34.4 G/DL (ref 31–36)
MCV RBC AUTO: 93.1 FL (ref 80–100)
MONOCYTES ABSOLUTE: 0.2 K/UL (ref 0–1.3)
MONOCYTES RELATIVE PERCENT: 2.3 %
NEUTROPHILS ABSOLUTE: 7.9 K/UL (ref 1.7–7.7)
NEUTROPHILS RELATIVE PERCENT: 86.1 %
PDW BLD-RTO: 13.6 % (ref 12.4–15.4)
PLATELET # BLD: 315 K/UL (ref 135–450)
PMV BLD AUTO: 7.2 FL (ref 5–10.5)
POTASSIUM REFLEX MAGNESIUM: 4.1 MMOL/L (ref 3.5–5.1)
RBC # BLD: 3.41 M/UL (ref 4.2–5.9)
SODIUM BLD-SCNC: 135 MMOL/L (ref 136–145)
WBC # BLD: 9.2 K/UL (ref 4–11)

## 2019-05-10 PROCEDURE — 2500000003 HC RX 250 WO HCPCS: Performed by: INTERNAL MEDICINE

## 2019-05-10 PROCEDURE — 80048 BASIC METABOLIC PNL TOTAL CA: CPT

## 2019-05-10 PROCEDURE — 6360000002 HC RX W HCPCS: Performed by: INTERNAL MEDICINE

## 2019-05-10 PROCEDURE — 6370000000 HC RX 637 (ALT 250 FOR IP): Performed by: INTERNAL MEDICINE

## 2019-05-10 PROCEDURE — 97165 OT EVAL LOW COMPLEX 30 MIN: CPT

## 2019-05-10 PROCEDURE — 2580000003 HC RX 258: Performed by: INTERNAL MEDICINE

## 2019-05-10 PROCEDURE — 97535 SELF CARE MNGMENT TRAINING: CPT

## 2019-05-10 PROCEDURE — 36415 COLL VENOUS BLD VENIPUNCTURE: CPT

## 2019-05-10 PROCEDURE — 97161 PT EVAL LOW COMPLEX 20 MIN: CPT

## 2019-05-10 PROCEDURE — 99223 1ST HOSP IP/OBS HIGH 75: CPT | Performed by: INTERNAL MEDICINE

## 2019-05-10 PROCEDURE — 85025 COMPLETE CBC W/AUTO DIFF WBC: CPT

## 2019-05-10 PROCEDURE — 99222 1ST HOSP IP/OBS MODERATE 55: CPT | Performed by: NURSE PRACTITIONER

## 2019-05-10 PROCEDURE — 1200000000 HC SEMI PRIVATE

## 2019-05-10 PROCEDURE — 87641 MR-STAPH DNA AMP PROBE: CPT

## 2019-05-10 PROCEDURE — 97116 GAIT TRAINING THERAPY: CPT

## 2019-05-10 RX ADMIN — Medication 10 ML: at 20:46

## 2019-05-10 RX ADMIN — ENOXAPARIN SODIUM 40 MG: 40 INJECTION SUBCUTANEOUS at 08:19

## 2019-05-10 RX ADMIN — CLOBETASOL PROPIONATE: 0.5 CREAM TOPICAL at 08:19

## 2019-05-10 RX ADMIN — METHYLPREDNISOLONE SODIUM SUCCINATE 40 MG: 40 INJECTION, POWDER, FOR SOLUTION INTRAMUSCULAR; INTRAVENOUS at 06:21

## 2019-05-10 RX ADMIN — METHYLPREDNISOLONE SODIUM SUCCINATE 40 MG: 40 INJECTION, POWDER, FOR SOLUTION INTRAMUSCULAR; INTRAVENOUS at 17:27

## 2019-05-10 RX ADMIN — FAMOTIDINE 20 MG: 10 INJECTION, SOLUTION INTRAVENOUS at 20:45

## 2019-05-10 RX ADMIN — LEVOTHYROXINE SODIUM 75 MCG: 75 TABLET ORAL at 06:20

## 2019-05-10 RX ADMIN — Medication 10 ML: at 08:19

## 2019-05-10 RX ADMIN — PANTOPRAZOLE SODIUM 40 MG: 40 TABLET, DELAYED RELEASE ORAL at 06:20

## 2019-05-10 RX ADMIN — DIPHENHYDRAMINE HYDROCHLORIDE 12.5 MG: 50 INJECTION, SOLUTION INTRAMUSCULAR; INTRAVENOUS at 06:21

## 2019-05-10 RX ADMIN — APIXABAN 2.5 MG: 5 TABLET, FILM COATED ORAL at 20:46

## 2019-05-10 RX ADMIN — SODIUM CHLORIDE: 9 INJECTION, SOLUTION INTRAVENOUS at 17:27

## 2019-05-10 RX ADMIN — DIPHENHYDRAMINE HYDROCHLORIDE 12.5 MG: 50 INJECTION, SOLUTION INTRAMUSCULAR; INTRAVENOUS at 11:16

## 2019-05-10 RX ADMIN — CLOBETASOL PROPIONATE: 0.5 CREAM TOPICAL at 20:46

## 2019-05-10 RX ADMIN — METHYLPREDNISOLONE SODIUM SUCCINATE 40 MG: 40 INJECTION, POWDER, FOR SOLUTION INTRAMUSCULAR; INTRAVENOUS at 11:16

## 2019-05-10 RX ADMIN — DIPHENHYDRAMINE HYDROCHLORIDE 12.5 MG: 50 INJECTION, SOLUTION INTRAMUSCULAR; INTRAVENOUS at 17:27

## 2019-05-10 RX ADMIN — FAMOTIDINE 20 MG: 10 INJECTION, SOLUTION INTRAVENOUS at 08:19

## 2019-05-10 ASSESSMENT — ENCOUNTER SYMPTOMS
TROUBLE SWALLOWING: 0
RHINORRHEA: 0
WHEEZING: 0
EYE DISCHARGE: 0
EYE REDNESS: 0
SORE THROAT: 0
NAUSEA: 0
BACK PAIN: 0
CONSTIPATION: 0
COUGH: 0
SHORTNESS OF BREATH: 0
ABDOMINAL PAIN: 0
DIARRHEA: 0

## 2019-05-10 ASSESSMENT — PAIN SCALES - GENERAL: PAINLEVEL_OUTOF10: 0

## 2019-05-10 NOTE — CONSULTS
Infectious Diseases   Consult Note        Admission Date: 5/9/2019  Hospital Day: Hospital Day: 2  Attending: Willard Gilford, MD  Date of service: 5/10/19    Presenting complaint:   Chief Complaint   Patient presents with    Rash     pt seen here yesterday for hives/ rash following knee surgery- on 4/25/19- states started benadryl and prednisone- pt and wife concerned d/t new blisters on thumb region. called PCP told to come back in        Reason for admission: Allergic reaction to drug, sequela [T78.40XS]  Allergic reaction to drug, sequela [T78.40XS]    Chief complaint/ Reason for consult: New rash and hypotension    Problem list:       Patient Active Problem List   Diagnosis Code    Allergic reaction caused by a drug T78.40XA    Failure of outpatient treatment Z78.9    Hypotension I95.9    Hyponatremia E87.1    Dehydration E86.0    HTN (hypertension) I10    Obesity (BMI 30-39. 9) E66.9    Leukocytosis D72.829    Anemia D64.9    Coagulopathy (HCC) D68.9    Hypothyroid E03.9    Allergic drug reaction T78.40XA    Rash R21    Blood blister T14. 8XXA    Urticaria L50.9    Elevated C-reactive protein (CRP) R79.82    History of arthroplasty of left knee Z96.652    Elevated sed rate R70.0    Overweight E66.3         Microbiology:        I have reviewed allavailable micro lab data and cultures    · Blood culture (2/2) - collected on 5/9/2019: Negative so far        Assessment:     The patient is a 70 y.o. old male who  has a past medical history of Hypertension and Thyroid disease. with following problems:    · New rash and hypotension - likely a hypersensitivty eraction  · Failure of outpatient management  · Status post left total knee arthroplasty on 4/25/19 at [facility]  · Essential hypertension  · Hypothyroidism  · Overweight due to excess calorie intake : Body mass index is 29.71 kg/m². · Elevated sed rate and CRP        Discussion:      The patient had an RBC count of 11,500 on admission. patient'scondition and what to expect. All of the patient's questions were addressed in a satisfactory manner and patient verbalized understanding all instructions. Thank you for involving me in the care of your patient. I will continue to follow. If you have any additional questions, please do not hesitate to contact me. Subjective:       HPI: Obed Baron is a 70 y.o. male patient, who was seen at the request of Dr. Winnie Dixon MD.    History was obtained from chart review and the patient. The patient was admitted on 5/9/2019. I have been consulted to see the patient for above mentioned reason(s). The patient has multiple medical comorbidities, and presented to the ER for worsening rash on the body. The patient presented to [facility] ER on 5/8/19 for diffuse itchy body rash. He was given at one edge to blockade, epinephrine injections and steroids and was thought to have an idiosyncratic reaction to an unknown agent. He was discharged home. He developed a diffuse itchy body rash again involving hands, forearms and legs. The patient has been on baby aspirin and oxycodone at home after his surgery. He presented to the ER. He had mild white cell count elevation of 11,500 with no eosinophilia. It is also slightly hyponatremic. Blood cultures were sent and he was admitted. I was called by the ER physician yesterday and they recommended holding off antibiotics as there was no clear sign of infection. I have been asked to see this patient for a full consultation for further recommendations and management                   Past Medical History: All past medical history reviewed today. Past Medical History:   Diagnosis Date    Hypertension     Thyroid disease          Past Surgical History: All pastsurgical history was reviewed today.     Past Surgical History:   Procedure Laterality Date    KNEE SURGERY         Social History:  All social andepidemiologic history was reviewed and updated by me today as needed. · Tobacco use:   reports that he has never smoked. He has never used smokeless tobacco.  · Alcohol use:   reports that he does not drink alcohol. · Currently lives in: [address] [place] [address] Dr Hair  has no drug history on file. Immunization History: All immunization history was reviewed by me today. There is no immunization history on file for this patient. Family History: All family history was reviewed today. Problem Relation Age of Onset    Diabetes Mother     Diabetes Father          Medications: All current and past medications were reviewed. Medications Prior to Admission: levothyroxine (SYNTHROID) 75 MCG tablet, Take 75 mcg by mouth Daily  hydrochlorothiazide (HYDRODIURIL) 25 MG tablet, Take 25 mg by mouth daily  lisinopril (PRINIVIL;ZESTRIL) 40 MG tablet, Take 40 mg by mouth daily  aspirin 325 MG tablet, Take 325 mg by mouth 2 times daily  ondansetron (ZOFRAN ODT) 4 MG disintegrating tablet, Take 1 tablet by mouth every 8 hours as needed for Nausea Let dissolve in mouth. diphenhydrAMINE (BENADRYL) 25 MG capsule, Take 1 capsule by mouth every 6 hours as needed for Itching  predniSONE (DELTASONE) 50 MG tablet, Take 1 tablet by mouth daily for 5 days     levothyroxine  75 mcg Oral Daily    sodium chloride flush  10 mL Intravenous 2 times per day    enoxaparin  40 mg Subcutaneous Daily    methylPREDNISolone  40 mg Intravenous Q6H    diphenhydrAMINE  12.5 mg Intravenous Q6H    famotidine (PEPCID) injection  20 mg Intravenous BID    clobetasol   Topical BID    pantoprazole  40 mg Oral QAM AC       Current antibiotics: All antibiotics and their doses were reviewed by me    Recent Abx Admin      No antibiotic orders with administrations found. REVIEW OF SYSTEMS:       Review of Systems   Constitutional: Negative for chills, diaphoresis and fever.    HENT: Negative for ear discharge, ear pain, rhinorrhea, sore throat and trouble swallowing. Some lip swelling. Had some throat itching yesterday   Eyes: Negative for discharge and redness. Respiratory: Negative for cough, shortness of breath and wheezing. Cardiovascular: Negative for chest pain and leg swelling. Gastrointestinal: Negative for abdominal pain, constipation, diarrhea and nausea. Endocrine: Negative for polyuria. Genitourinary: Negative for dysuria, flank pain, frequency, hematuria and urgency. Musculoskeletal: Positive for arthralgias (b/l hand swelling). Negative for back pain and myalgias. Skin: Positive for rash. Neurological: Negative for dizziness, seizures and headaches. Hematological: Does not bruise/bleed easily. Psychiatric/Behavioral: Negative for hallucinations and suicidal ideas. All other systems reviewed and are negative. Objective:       PHYSICAL EXAM:      Vitals:   Vitals:    05/10/19 0029 05/10/19 0425 05/10/19 0748 05/10/19 1113   BP: 110/68 115/69 106/66 124/72   Pulse: 88 74 81 104   Resp: 16 16 16 18   Temp: 98.3 °F (36.8 °C) 97.9 °F (36.6 °C) 97.4 °F (36.3 °C) 97.9 °F (36.6 °C)   TempSrc: Oral Oral Temporal Temporal   SpO2: 96% 96% 97% 95%   Weight:  213 lb 0.4 oz (96.6 kg)     Height:           Physical Exam   Constitutional: He is oriented to person, place, and time. He appears well-developed. HENT:   Head: Normocephalic and atraumatic. Mouth/Throat: Oropharynx is clear and moist. No oropharyngeal exudate. Eyes: Pupils are equal, round, and reactive to light. Conjunctivae and EOM are normal. Right eye exhibits no discharge. Left eye exhibits no discharge. No scleral icterus. Neck: Normal range of motion. Neck supple. Cardiovascular: Normal rate and regular rhythm. Exam reveals no friction rub. No murmur heard. Pulmonary/Chest: No stridor. No respiratory distress. He has no wheezes. He has no rales. Abdominal: Soft. Bowel sounds are normal. There is no tenderness.  There is no rebound and no part ofthe consultation. Known drug Allergies: All allergies were reviewed and updated    No Known Allergies      Please note that this chart was generated using Dragon dictation software. Although every effort was made to ensure the accuracy of this automated transcription, some errors in transcription may have occurred inadvertently. If you may need any clarification, please do not hesitate to contact me through EPIC or at the phone number provided below with my electronic signature.       Celso Romero MD, MPH  5/10/2019, 12:09 PM  [facility] Infectious Disease   Office: [phone]  Fax: [phone]  Tuesday AM clinic:   [address] [place], [place] [address]  Thursday AM clinic: [address] [facility]

## 2019-05-10 NOTE — PROGRESS NOTES
4 Eyes Skin Assessment     The patient is being assess for  Admission    I agree that 2 RN's have performed a thorough Head to Toe Skin Assessment on the patient. ALL assessment sites listed below have been assessed. Areas assessed by both nurses:   [x]   Head, Face, and Ears   [x]   Shoulders, Back, and Chest  [x]   Arms, Elbows, and Hands   [x]   Coccyx, Sacrum, and IschIum  [x]   Legs, Feet, and Heels        Does the Patient have Skin Breakdown?   No         Obed Prevention initiated:  NA   Wound Care Orders initiated:  NA      [facility] nurse consulted for Pressure Injury (Stage 3,4, Unstageable, DTI, NWPT, and Complex wounds), New and Established Ostomies:  NA      Nurse 1 eSignature: Electronically signed by Jacob Olvera RN on 5/10/19 at 5:39 AM    **SHARE this note so that the co-signing nurse is able to place an eSignature**    Nurse 2 eSignature: Electronically signed by Carey Piecre RN on 5/10/19 at 5:41 AM

## 2019-05-10 NOTE — PROGRESS NOTES
Admitted per stretcher from ED to 5914. Family present. Pt oriented to room and environment. Left knee incision well approximated with no drainage noted. Steri strips in place. Pt denies pain at present. No needs or concerns expressed. Will continue to monitor.

## 2019-05-10 NOTE — PROGRESS NOTES
[facility]    5/10/2019 3:33 PM        Name: Rita Rainey .              Admitted: 5/9/2019  Primary Care Provider: Paolo Velazquez (Tel: [phone])    Brief Course:  70 y.o. male with history of HTN, hypothyroidism, obesity who underwent elective L TKA on 4/25/19 with Dr Washington Solano at [address] to ER twice with worsening rash. Failed outpatient Prednisone, Pepcid and Benadryl from ED. Was hypotensive at home and came to ER. Admitted as inpatient. Treated for angioedema and hypotension. Stopped ASA bid, Lisinopril (added angioedema as allergy) and HCTZ. On IV Steroids, IV Pepcid and IV Benadryl. Seen by Ortho, recommend Eliquis X 14 days upon discussion with Dr Betty Campuzano office. CC:  Rash    Subjective:  . Patient now with Left upper lip swelling. No sore throat or SOB. No CP, HA or fevers. R hand swollen still. L hand improving. Rash beginning to akilah a little.     Reviewed interval ancillary notes    Current Medications    apixaban (ELIQUIS) tablet 2.5 mg BID   levothyroxine (SYNTHROID) tablet 75 mcg Daily   sodium chloride flush 0.9 % injection 10 mL 2 times per day   sodium chloride flush 0.9 % injection 10 mL PRN   magnesium hydroxide (MILK OF MAGNESIA) 400 MG/5ML suspension 30 mL Daily PRN   ondansetron (ZOFRAN) injection 4 mg Q6H PRN   0.9 % sodium chloride infusion Continuous   acetaminophen (TYLENOL) tablet 650 mg Q4H PRN   methylPREDNISolone sodium (SOLU-MEDROL) injection 40 mg Q6H   diphenhydrAMINE (BENADRYL) injection 12.5 mg Q6H   famotidine (PEPCID) injection 20 mg BID   clobetasol (TEMOVATE) 0.05 % cream BID   pantoprazole (PROTONIX) tablet 40 mg QAM AC   aluminum & magnesium hydroxide-simethicone (MAALOX) 200-200-20 MG/5ML suspension 30 mL Q6H PRN       Objective:  /72   Pulse 90   Temp 97.9 °F (36.6 °C) (Temporal)   Resp 18   Ht 5' 11\" (1.803 m)   Wt 213 lb 0.4 oz (96.6 kg)   SpO2 95%   BMI 29.71 kg/m²     Intake/Output Summary (Last 24 hours) at 5/10/2019 1533  Last data filed at 5/10/2019 1435  Gross per 24 hour   Intake 1239.8 ml   Output --   Net 1239.8 ml    Wt Readings from Last 3 Encounters:   05/10/19 213 lb 0.4 oz (96.6 kg)   05/08/19 215 lb (97.5 kg)       General appearance:  Appears comfortable. Well nourished, obese, pleasant  Eyes: Sclera clear, pupils equal  ENT: Moist mucus membranes, no thrush. Trachea midline. Left upper lip slightly swollen. No stridor  Cardiovascular: Regular rhythm, normal S1, S2. No murmur, gallop, rub. No edema in lower extremities  Respiratory: Clear to auscultation bilaterally, no wheeze, good inspiratory effort  Gastrointestinal: Abdomen soft, obese, non-tender, not distended, normal bowel sounds  Musculoskeletal: L TKA site without erythema, tenderness or swelling. Has good ROM  Neurology: Grossly intact. Alert and oriented in time, place and person. No speech or motor deficits  Psychiatry: Appropriate affect. Not agitated  Skin: Annular rash noted on palmar surfaces improving. Blanching rash on arms present. Rash on legs and back fading  Brisk capillary refill, peripheral pulses palpable         Labs and Tests:  CBC:   Recent Labs     05/09/19  1313 05/10/19  0606   WBC 11.5* 9.2   HGB 12.3* 10.9*    315     BMP:  Recent Labs     05/09/19  1313 05/10/19  0606   * 135*   K 4.2 4.1   CL 96* 103   CO2 20* 21   BUN 27* 24*   CREATININE 0.9 0.8   GLUCOSE 189* 154*     Hepatic: Recent Labs     05/09/19  1313   AST 19   ALT 19   BILITOT 1.3*   ALKPHOS 73     XR CHEST STANDARD (2 VW)   Final Result   No acute process. VL Extremity Venous Left   Final Result            Problem List  Principal Problem: Allergic reaction caused by a drug  Active Problems:    Failure of outpatient treatment    Hypotension    Hyponatremia    Dehydration    HTN (hypertension)    Obesity (BMI 30-39. 9)    Leukocytosis Anemia    Coagulopathy (HCC)    Hypothyroid    Allergic drug reaction    Rash    Blood blister    Urticaria    Elevated C-reactive protein (CRP)    History of arthroplasty of left knee    Elevated sed rate    Overweight    Angioedema  Resolved Problems:    * No resolved hospital problems. *       Assessment & Plan:   1. Cont Solumedrol 40 mg IV q6h until Sunday  2. Cont Pepcid and Benadryl IV until Sunday  3. Added Lisinopril as severe allergy for angioedema  4. Start Eliquis X 14 days, stop Lovenox  5. Do not resume  on DC  6. Cont IVF  7. PT/OT eval    IV Access: Peripheral  Kolb: No  Diet: DIET GENERAL;  Code:Full Code  DVT PPX Eliquis  Disposition Home    Discussed with patient, Dr Clotilde Perez (ID), nursing and CM. Needs IV steroids, benadryl and Pepcid for 72 hrs before transition to PO. D/W wife.       Jairo Tucker MD   5/10/2019 3:33 PM

## 2019-05-10 NOTE — PROGRESS NOTES
Physical Therapy    Facility/Department: [facility]  Initial Assessment/Discharge summary    NAME: Nilam Irizarry  : 1955  MRN: [id number]      This patient was seen on 5/10/19 for evaluation only, prior to discharge from the hospital to home. Please refer to the initial evaluation below for the functional status at discharge, as this will serve as the discharge note. Kirt Salcido PT, [place] [id number]    Date of Service: 5/10/2019    Discharge Recommendations: Nilam Irizarry scored a 22/24 on the AM-PAC short mobility form. Current research shows that an AM-PAC score of 18 or greater is typically associated with a discharge to the patient's home setting. Based on the patients AM-PAC score and their current functional mobility deficits, it is recommended that the patient have 2-3 sessions per week of Physical Therapy at d/c to increase the patients independence. Recommend pt continue with OP PT services upon d/c to continue rehabilitation of L knee s/p TKA. PT Equipment Recommendations  Equipment Needed: No    Assessment   Body structures, Functions, Activity limitations: Decreased balance;Decreased endurance  Assessment: Pt presents with slightly decreased functional endurance and decreased dynamic standing balance impairing his ability to perform functional mobility safely and independently. Pt would benefit from acute PT to address deficits. Treatment Diagnosis: impaired gait and balance  Prognosis: Excellent  Decision Making: Low Complexity  Clinical Presentation: stable  Patient Education: PT POC and elmer d/c recommendations  Barriers to Learning: none  REQUIRES PT FOLLOW UP: Yes  Activity Tolerance  Activity Tolerance: Patient Tolerated treatment well       Patient Diagnosis(es): The primary encounter diagnosis was Rash. Diagnoses of Hypotension, unspecified hypotension type, Hyponatremia, Blood blister, Urticaria, and Failure of outpatient treatment were also pertinent to this visit.      has a past medical history of Hypertension and Thyroid disease. has a past surgical history that includes knee surgery. Restrictions  Restrictions/Precautions  Restrictions/Precautions: Fall Risk, Weight Bearing  Lower Extremity Weight Bearing Restrictions  Left Lower Extremity Weight Bearing: Weight Bearing As Tolerated  Position Activity Restriction  Other position/activity restrictions: S/P L TKA 4/25. To ED with allergic reaction to aspirin. Vision/Hearing  Vision: Impaired  Vision Exceptions: Wears glasses at all times  Hearing: Within functional limits       Subjective  General  Chart Reviewed: Yes  Patient assessed for rehabilitation services?: Yes  Response To Previous Treatment: Not applicable  Family / Caregiver Present: Yes  Diagnosis: allergic reaction  Follows Commands: Within Functional Limits  General Comment  Comments: Pt seated at couch with family members upon arrival. Agreeable to PT/OT eval.   Subjective  Subjective: Pt denying pain at this time. Pain Screening  Patient Currently in Pain: Denies  Vital Signs  Patient Currently in Pain: Denies       Orientation  Orientation  Overall Orientation Status: Within Functional Limits     Social/Functional History  Social/Functional History  Lives With: Spouse  Type of Home: Apartment  Home Layout: One level  Home Access: Level entry  Bathroom Shower/Tub: Tub/Shower unit  Bathroom Toilet: Standard  Home Equipment: Quad cane, Crutches, Rolling walker  Receives Help From: Outpatient therapy(pt has been going to OP PT for 2 weeks thus far)  ADL Assistance: Independent  Homemaking Assistance: Independent  Ambulation Assistance: Independent(using QC following TKA)  Transfer Assistance: Independent  Active : No  Patient's  Info: not since surgery  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       Cognition    WFL    Objective  Observation/Palpation  Posture: Good  Observation: steri-strips on surgical incision to L knee    AROM RLE (degrees)  RLE AROM: WFL  AROM LLE (degrees)  LLE AROM : WFL  LLE General AROM: knee flexion: 95 degrees  Strength RLE  Strength RLE: WFL  Strength LLE  Strength LLE: WFL  Tone RLE  RLE Tone: Normotonic  Tone LLE  LLE Tone: Normotonic  Motor Control  Gross Motor?: WFL  Sensation  Overall Sensation Status: WFL     Bed mobility  Comment: Not addressed, pt seated in chair at beginning and end of session. Transfers  Sit to Stand: Supervision  Stand to sit: Supervision  Comment: safe hand placement when transferring with QC  Ambulation  Ambulation?: Yes  Ambulation 1  Surface: level tile  Device: Small StartSpanishon  Assistance: Supervision  Quality of Gait: 3 point gait pattern, slightly decreased cody, lack of terminal L knee extension during swing phase, decreased L toe off  Distance: 300'  Comments: Pt able to negotiate obstacles without assist. No LOB. Stairs/Curb  Stairs?: No     Balance  Posture: Good  Sitting - Static: Good  Sitting - Dynamic: Good  Standing - Static: Good;-  Standing - Dynamic: Good;-        Plan   Plan  Times per week: 7x  Times per day: Daily  Current Treatment Recommendations: Strengthening, Balance Training, ROM, Functional Mobility Training, Transfer Training, Endurance Training, Gait Training, Neuromuscular Re-education, Safety Education & Training, Modalities, Patient/Caregiver Education & Training, Home Exercise Program  Safety Devices  Type of devices:  All fall risk precautions in place, Left in chair, Call light within reach, Gait belt, Nurse notified(WAYNE Armendariz, aware and reporting pt OK to be seated at couch without alarm on)  Restraints  Initially in place: No    G-Code       OutComes Score       AM-PAC Score  AM-PAC Inpatient Mobility Raw Score : 22  AM-PAC Inpatient T-Scale Score : 53.28  Mobility Inpatient CMS 0-100% Score: 20.91  Mobility Inpatient CMS G-Code Modifier : CJ          Goals  Short term

## 2019-05-10 NOTE — PROGRESS NOTES
Occupational Therapy   Occupational Therapy Initial Assessment/discharge  Date: 5/10/2019   Patient Name: Arpit Watson  MRN: [id number]     : 1955    Date of Service: 5/10/2019    Discharge Recommendations:  Arpit Watson scored a 23/24 on the AM-PAC ADL Inpatient form. At this time, no further OT is recommended upon discharge due to pt at baseline for ADL/IADLs. Return to outpatient PT at d/c. Recommend patient returns to prior setting with prior services. OT Equipment Recommendations  Equipment Needed: No    Assessment   Assessment: pt at baseline, no further skilled OT services indicated at this time  Treatment Diagnosis: allergic reaction   Prognosis: Good  Decision Making: Low Complexity  History: pt lives at home with wife in an apt. he is independent with ADL/IADL tasks and has been using a quad cane since L TKA  Assistance / Modification: mod I with ADL task  Patient Education: OT eval, POC, discharge, donning boris hose, transfers  REQUIRES OT FOLLOW UP: No  Activity Tolerance  Activity Tolerance: Patient Tolerated treatment well  Safety Devices  Safety Devices in place: Yes  Type of devices: All fall risk precautions in place; Left in chair;Call light within reach;Nurse notified(med fall risk)           Patient Diagnosis(es): The primary encounter diagnosis was Rash. Diagnoses of Hypotension, unspecified hypotension type, Hyponatremia, Blood blister, Urticaria, and Failure of outpatient treatment were also pertinent to this visit. has a past medical history of Hypertension and Thyroid disease. has a past surgical history that includes knee surgery. Treatment Diagnosis: allergic reaction       Restrictions  Restrictions/Precautions  Restrictions/Precautions: Fall Risk, Weight Bearing  Lower Extremity Weight Bearing Restrictions  Left Lower Extremity Weight Bearing: Weight Bearing As Tolerated  Position Activity Restriction  Other position/activity restrictions: S/P L TKA .  To ED with allergic reaction to aspirin. Subjective   General  Chart Reviewed: Yes  Patient assessed for rehabilitation services?: Yes  Family / Caregiver Present: Yes(wife and son's girlfriend present)  Diagnosis: allergic reaction to aspirin, level SBP  Subjective  Subjective: pt seated on couch upon arrival and agreeable to OT/PT eval    PT denies pain     Social/Functional History  Social/Functional History  Lives With: Spouse  Type of Home: Apartment  Home Layout: One level  Home Access: Level entry  Bathroom Shower/Tub: Tub/Shower unit  Bathroom Toilet: Standard  Home Equipment: Quad cane, Crutches, Rolling walker  Receives Help From: Outpatient therapy(pt has been going to OP PT for 2 weeks thus far)  ADL Assistance: Independent  Homemaking Assistance: Independent  Ambulation Assistance: Independent(using QC following TKA)  Transfer Assistance: Independent  Active : No  Patient's  Info: not since surgery  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 Objective        Orientation  Overall Orientation Status: Within Normal Limits  Observation/Palpation  Posture: Good  Observation: steri-strips on surgical incision to L knee  Edema: B edema in hands, worse in L hand. L hand pitting  Balance  Sitting Balance: Independent  Standing Balance: Supervision  Standing Balance  Time: ~10 minutes  Activity: functional mobility around unit ~300 ft total with quad cane  Functional Mobility  Functional - Mobility Device: Cane  Activity: Other  Assist Level: Supervision  Functional Mobility Comments: quad cane used, no LOB  ADL  LE Dressing: Modified independent ;Minimal assistance(Avni for son's girlfriend, pt most likely able to complete mod I with verbal cues.)  Additional Comments: pt reports no difficulty since knee sx for ADL/IADL tasks.  discussed possible use of shower chair with pt  Tone ESTELA  RUMARRY Tone: Normotonic  Tone INGRIS AMADO Tone: Normotonic  Coordination  Movements Are Fluid And Coordinated: Yes     Bed mobility  Comment: not addressed, pt seated on couch at beginning and end of session  Transfers  Sit to stand: Supervision  Stand to sit: Supervision  Transfer Comments: couch>functional mobility>couch     Cognition  Overall Cognitive Status: WNL  Perception  Overall Perceptual Status: WFL     Sensation  Overall Sensation Status: WFL        LUE AROM (degrees)  LUE AROM : WNL  RUE AROM (degrees)  RUE AROM : WNL  LUE Strength  Gross LUE Strength: WNL  RUE Strength  Gross RUE Strength:  WNL                   Plan   Plan  Times per week: eval and d/c    G-Code     OutComes Score                                                  AM-PAC Score        AM-[facility] Inpatient Daily Activity Raw Score: 23  AM-PAC Inpatient ADL T-Scale Score : 51.12  ADL Inpatient CMS 0-100% Score: 15.86  ADL Inpatient CMS G-Code Modifier : CI    Goals  Patient Goals   Patient goals : pt at baseline, no further skilled OT services indicated at this time       Therapy Time   Individual Concurrent Group Co-treatment   Time In 1354         Time Out 1418         Minutes 24              Timed Code Treatment Minutes:   9    Total Treatment Minutes:  [address], OTR/L Emmanuelle OT

## 2019-05-10 NOTE — PROGRESS NOTES
[facility] Orthopedic Surgery  Consult Note    Patient: Lidya Shanks  Admit Date: 5/9/2019  Requesting Physician: Jeannine Patel MD  Room: [place] [address]/[id number]    Chief complaint: Rash and bilateral hand swelling     HPI: Lidya Shanks is a 70 y.o. male who presented to [facility] ER for the second time regarding a rash with bilateral hand swelling and multiple low SBP readings a home. He underwent LEFT TKA with Dr. Sorin Solorio at [facility] on 4/25/19. He denies injuries. He denies fever. He denies knee pain today. He has been working with outpatient therapy. He was wearing SUNDEEP hose at home, but does not have them here. Denies new numbness/tingling. Imaging review of Left venous doppler study from 5/6/19 demonstrated: No evidence of deep vein or superficial thrombosis involving the left lower extremity. Patient lives at home with his wife and uses a four point cane to ambulate. Medical History:  Past Medical History:   Diagnosis Date    Hypertension     Thyroid disease      Past Surgical History:   Procedure Laterality Date    KNEE SURGERY         Social History:    reports that he has never smoked.  He has never used smokeless tobacco.    Family History:        Problem Relation Age of Onset    Diabetes Mother     Diabetes Father        Medications:  ALL MEDICATIONS HAVE BEEN REVIEWED:  Scheduled:   levothyroxine  75 mcg Oral Daily    sodium chloride flush  10 mL Intravenous 2 times per day    enoxaparin  40 mg Subcutaneous Daily    methylPREDNISolone  40 mg Intravenous Q6H    diphenhydrAMINE  12.5 mg Intravenous Q6H    famotidine (PEPCID) injection  20 mg Intravenous BID    clobetasol   Topical BID    pantoprazole  40 mg Oral QAM AC     Continuous:   sodium chloride 100 mL/hr at 05/09/19 2236     PRN:sodium chloride flush, magnesium hydroxide, ondansetron, acetaminophen, aluminum & magnesium hydroxide-simethicone    Allergies: No Known Allergies    Review of Systems:  Constitutional: Negative for fever, chills, fatigue. Skin:  Negative for pruritis, rash  Eyes: Negative for photophobia and visual disturbance. ENT:  Negative for rhinorrhea, epistaxis, sore throat  Respiratory:  Negative for cough and shortness of breath. Cardiovascular: Negative for chest pain. Gastrointestinal: Negative for nausea, vomiting, diarrhea. Genitourinary: Negative for dysuria and difficulty urinating. Neurological: Negative for confusion, dysarthria, tremors, seizures. Psychiatric:  Negative for depression or anxiety  Musculoskeletal:  Negative for erythema,       Objective:  Vitals:    05/10/19 0748   BP: 106/66   Pulse: 81   Resp: 16   Temp: 97.4 °F (36.3 °C)   SpO2: 97%      Physical Examination:  GENERAL: No apparent distress, alert and oriented, well-nourished  SKIN:  Warm and dry  EYES: Nonicteric. ENT: Mucous membranes moist  HEAD: Normocephalic, atraumatic  RESPIRATORY: Resp easy and unlabored  CARDIOVASCULAR: Regular rate and rhythm  GI: Abdomen soft, nontender  NEURO: Awake and alert. No speech defect  PSYCHIATRIC: Appropriate affect; not agitated  MUSCULOSKELETAL:  LEFT LE  Inspection: Midline anterior incision over the left knee is covered with Steri strips that are well-adhered. No surrounding erythema. No warmth. Mild soft tissue swelling as expected 15 days post-op TKA. Motor: Strength about the left leg is 4+/5 about all muscle groups about the knee. Able to dorsi and plantar flex the ankle without issue. Sensation: Intact to light touch along tibial and peroneal distributions.    Vascular:  2+ DP pulse  Sensory:    Right Upper Extremity:  normal  Left Upper Extremity:  normal  Right Lower Extremity:  normal  Left Lower Extremity:  normal    Labs reviewed:  Recent Labs     05/09/19  1313 05/10/19  0606   WBC 11.5* 9.2   HGB 12.3* 10.9*   HCT 36.9* 31.7*    315     Recent Labs     05/09/19  1313 05/10/19  0606   * 135*   K 4.2 4.1   CL 96* 103 CO2 20* 21   BUN 27* 24*   CREATININE 0.9 0.8   GLUCOSE 189* 154*   CALCIUM 9.2 8.9     Recent Labs     05/09/19  1312   INR 1.22*   PROTIME 13.9*       Lab Results   Component Value Date    COLORU YELLOW 05/09/2019    CLARITYU Clear 05/09/2019    PHUR 6.0 05/09/2019    GLUCOSEU Negative 05/09/2019    BLOODU Negative 05/09/2019    LEUKOCYTESUR Negative 05/09/2019    BILIRUBINUR Negative 05/09/2019    UROBILINOGEN 1.0 05/09/2019       Imaging:  XR CHEST STANDARD (2 VW)   Final Result   No acute process. VL Extremity Venous Left   Final Result          IMPRESSION:  S/p LEFT Total knee arthroplasty by Dr. Lalitha Murray (4/25/19)  Allergic reaction  Principal Problem: Allergic reaction caused by a drug  Active Problems:    Failure of outpatient treatment    Hypotension    Hyponatremia    Dehydration    HTN (hypertension)    Obesity (BMI 30-39. 9)    Leukocytosis    Anemia    Coagulopathy (HCC)    Hypothyroid    Allergic drug reaction  Resolved Problems:    * No resolved hospital problems. *      RECOMMENDATIONS:  WBAT LEFT leg  DVT prophylaxis - Lovenox currently; SUNDEEP hose (ordered), ambulate every two hours while awake. I spoke with Dr. Kelsi Duron office and they prefer her complete 14 days of 2.5mg Eliquis BID upon d/c from hospital.  No clinical concern for infectious etiology about the operative knee. Keep steri-strips in place until they fall off at home or cn be removed upon his follow-up appt with Dr. Lalitha Murray. I have reviewed imaging and plan with THEO Parrish-CNP  5/10/2019  8:52 AM

## 2019-05-10 NOTE — PROGRESS NOTES
Pt assessment completed and morning medications given. Vital signs are stable. Pt denies any pain or itching. Cream applied to affected areas. No other concerns, will continue to monitor.

## 2019-05-10 NOTE — CARE COORDINATION
Discharge Planning Assessment    SW discharge planner met with patient to discuss reason for admission, current living situation, and potential needs at the time of discharge    Demographics/Insurance verified Yes/No  yes    Current type of dwelling: Apartment- One Level    Living arrangements:  Lives with wife     Level of function/Support: Independent prior to admission. Was participating in Outpatient Physical therapy from total knee 4/25/19    PCP: Zenaida Mendoza    Last Visit to PCP: Prior to his knee replacement    DME: [employer]ARM[employer] , quad cane    Active with any community resources/agencies/skilled home care: No    Medication compliance issues: No    Financial issues that could impact healthcare: None identified. Transportation at the time of discharge: Wife    Tentative discharge plan:  Home with no needs. Will resume outpatient therapy. No other needs identified.      Electronically signed by JAMAAL Bunn on 5/10/2019 at 4:07 PM

## 2019-05-11 LAB
ANION GAP SERPL CALCULATED.3IONS-SCNC: 10 MMOL/L (ref 3–16)
BASOPHILS ABSOLUTE: 0 K/UL (ref 0–0.2)
BASOPHILS RELATIVE PERCENT: 0.1 %
BUN BLDV-MCNC: 24 MG/DL (ref 7–20)
CALCIUM SERPL-MCNC: 8.6 MG/DL (ref 8.3–10.6)
CHLORIDE BLD-SCNC: 109 MMOL/L (ref 99–110)
CO2: 21 MMOL/L (ref 21–32)
CREAT SERPL-MCNC: 0.7 MG/DL (ref 0.8–1.3)
EOSINOPHILS ABSOLUTE: 0 K/UL (ref 0–0.6)
EOSINOPHILS RELATIVE PERCENT: 0 %
GFR AFRICAN AMERICAN: >60
GFR NON-AFRICAN AMERICAN: >60
GLUCOSE BLD-MCNC: 178 MG/DL (ref 70–99)
HCT VFR BLD CALC: 30.8 % (ref 40.5–52.5)
HEMOGLOBIN: 10.6 G/DL (ref 13.5–17.5)
LYMPHOCYTES ABSOLUTE: 1.2 K/UL (ref 1–5.1)
LYMPHOCYTES RELATIVE PERCENT: 9.7 %
MCH RBC QN AUTO: 32 PG (ref 26–34)
MCHC RBC AUTO-ENTMCNC: 34.3 G/DL (ref 31–36)
MCV RBC AUTO: 93.4 FL (ref 80–100)
MONOCYTES ABSOLUTE: 0.2 K/UL (ref 0–1.3)
MONOCYTES RELATIVE PERCENT: 2 %
MRSA SCREEN RT-PCR: NORMAL
NEUTROPHILS ABSOLUTE: 10.6 K/UL (ref 1.7–7.7)
NEUTROPHILS RELATIVE PERCENT: 88.2 %
PDW BLD-RTO: 13.4 % (ref 12.4–15.4)
PLATELET # BLD: 335 K/UL (ref 135–450)
PMV BLD AUTO: 7.2 FL (ref 5–10.5)
POTASSIUM REFLEX MAGNESIUM: 4.4 MMOL/L (ref 3.5–5.1)
RBC # BLD: 3.3 M/UL (ref 4.2–5.9)
SODIUM BLD-SCNC: 140 MMOL/L (ref 136–145)
WBC # BLD: 12 K/UL (ref 4–11)

## 2019-05-11 PROCEDURE — 36415 COLL VENOUS BLD VENIPUNCTURE: CPT

## 2019-05-11 PROCEDURE — 2580000003 HC RX 258: Performed by: INTERNAL MEDICINE

## 2019-05-11 PROCEDURE — 6370000000 HC RX 637 (ALT 250 FOR IP): Performed by: INTERNAL MEDICINE

## 2019-05-11 PROCEDURE — 85025 COMPLETE CBC W/AUTO DIFF WBC: CPT

## 2019-05-11 PROCEDURE — 80048 BASIC METABOLIC PNL TOTAL CA: CPT

## 2019-05-11 PROCEDURE — 1200000000 HC SEMI PRIVATE

## 2019-05-11 PROCEDURE — 6360000002 HC RX W HCPCS: Performed by: INTERNAL MEDICINE

## 2019-05-11 PROCEDURE — 2500000003 HC RX 250 WO HCPCS: Performed by: INTERNAL MEDICINE

## 2019-05-11 RX ADMIN — LEVOTHYROXINE SODIUM 75 MCG: 75 TABLET ORAL at 05:06

## 2019-05-11 RX ADMIN — APIXABAN 2.5 MG: 5 TABLET, FILM COATED ORAL at 08:01

## 2019-05-11 RX ADMIN — Medication 10 ML: at 08:01

## 2019-05-11 RX ADMIN — DIPHENHYDRAMINE HYDROCHLORIDE 12.5 MG: 50 INJECTION, SOLUTION INTRAMUSCULAR; INTRAVENOUS at 17:36

## 2019-05-11 RX ADMIN — METHYLPREDNISOLONE SODIUM SUCCINATE 40 MG: 40 INJECTION, POWDER, FOR SOLUTION INTRAMUSCULAR; INTRAVENOUS at 00:07

## 2019-05-11 RX ADMIN — DIPHENHYDRAMINE HYDROCHLORIDE 12.5 MG: 50 INJECTION, SOLUTION INTRAMUSCULAR; INTRAVENOUS at 11:33

## 2019-05-11 RX ADMIN — SODIUM CHLORIDE: 9 INJECTION, SOLUTION INTRAVENOUS at 02:58

## 2019-05-11 RX ADMIN — PANTOPRAZOLE SODIUM 40 MG: 40 TABLET, DELAYED RELEASE ORAL at 05:06

## 2019-05-11 RX ADMIN — DIPHENHYDRAMINE HYDROCHLORIDE 12.5 MG: 50 INJECTION, SOLUTION INTRAMUSCULAR; INTRAVENOUS at 05:05

## 2019-05-11 RX ADMIN — METHYLPREDNISOLONE SODIUM SUCCINATE 40 MG: 40 INJECTION, POWDER, FOR SOLUTION INTRAMUSCULAR; INTRAVENOUS at 17:36

## 2019-05-11 RX ADMIN — CLOBETASOL PROPIONATE: 0.5 CREAM TOPICAL at 21:06

## 2019-05-11 RX ADMIN — Medication 10 ML: at 21:06

## 2019-05-11 RX ADMIN — CLOBETASOL PROPIONATE: 0.5 CREAM TOPICAL at 08:01

## 2019-05-11 RX ADMIN — FAMOTIDINE 20 MG: 10 INJECTION, SOLUTION INTRAVENOUS at 21:05

## 2019-05-11 RX ADMIN — METHYLPREDNISOLONE SODIUM SUCCINATE 40 MG: 40 INJECTION, POWDER, FOR SOLUTION INTRAMUSCULAR; INTRAVENOUS at 11:33

## 2019-05-11 RX ADMIN — APIXABAN 2.5 MG: 5 TABLET, FILM COATED ORAL at 21:05

## 2019-05-11 RX ADMIN — METHYLPREDNISOLONE SODIUM SUCCINATE 40 MG: 40 INJECTION, POWDER, FOR SOLUTION INTRAMUSCULAR; INTRAVENOUS at 05:05

## 2019-05-11 RX ADMIN — DIPHENHYDRAMINE HYDROCHLORIDE 12.5 MG: 50 INJECTION, SOLUTION INTRAMUSCULAR; INTRAVENOUS at 00:06

## 2019-05-11 RX ADMIN — FAMOTIDINE 20 MG: 10 INJECTION, SOLUTION INTRAVENOUS at 08:01

## 2019-05-11 ASSESSMENT — PAIN SCALES - GENERAL
PAINLEVEL_OUTOF10: 0
PAINLEVEL_OUTOF10: 0

## 2019-05-11 NOTE — PROGRESS NOTES
Shift assessment completed. Medications given per MAR. Patient up to bathroom using cane with minimal assistance. Denies pain. The care plan and education has been reviewed and mutually agreed upon with the patient.

## 2019-05-11 NOTE — PLAN OF CARE
Problem: Falls - Risk of:  Goal: Will remain free from falls  Description  Will remain free from falls  5/11/2019 1936 by Jered Means RN  Outcome: Ongoing  5/11/2019 0739 by Sherry Zaman RN  Outcome: Ongoing  Goal: Absence of physical injury  Description  Absence of physical injury  5/11/2019 1936 by Jered Means RN  Outcome: Ongoing  5/11/2019 0739 by Sherry Zaman RN  Outcome: Ongoing     Problem: Musculor/Skeletal Functional Status  Goal: Highest potential functional level  5/11/2019 1936 by Jered Means RN  Outcome: Ongoing  5/11/2019 0739 by Sherry Zaman RN  Outcome: Ongoing  Goal: Absence of falls  5/11/2019 1936 by Jered Means RN  Outcome: Ongoing  5/11/2019 0739 by Sherry Zaman RN  Outcome: Ongoing

## 2019-05-11 NOTE — PROGRESS NOTES
Morning assessment complete. Pedal pulses palpable, SUNDEEP hose in place. Declines being in pain. VSS. Lung sounds clear. No swelling noted. Cream applied to two red sections to hands. Pt demonstrated how to reach nurse with call light. Call light in reach. Will continue to monitor. The care plan and education has been reviewed and mutually agreed upon with the patient.

## 2019-05-11 NOTE — PROGRESS NOTES
(hypertension)    Obesity (BMI 30-39. 9)    Leukocytosis    Anemia    Coagulopathy (HCC)    Hypothyroid    Allergic drug reaction    Rash    Blood blister    Urticaria    Elevated C-reactive protein (CRP)    History of arthroplasty of left knee    Elevated sed rate    Overweight    Angioedema  Resolved Problems:    * No resolved hospital problems. *       Assessment & Plan:   1. Cont Solumedrol 40 mg IV q6h until tomorrow  2. Cont Pepcid and Benadryl IV until tomorrow  3. Added Lisinopril as severe allergy for angioedema  4. Finish Eliquis X 13 more days, stop Lovenox  5. Do not resume  on DC  6. Stop IVF  7. PT/OT elmer appreciate, wrote for OP PT  8. Does not require BP meds    IV Access: Peripheral  Kolb: No  Diet: DIET GENERAL;  Code:Full Code  DVT PPX Eliquis  Disposition Home    Discussed with patient, nursing and wife. Home tomorrow on long steroid/H1/H2 PO course with PCP re-evaluation.       Winnie Dixon MD   5/11/2019 1:26 PM

## 2019-05-12 VITALS
HEART RATE: 70 BPM | DIASTOLIC BLOOD PRESSURE: 69 MMHG | BODY MASS INDEX: 29.82 KG/M2 | OXYGEN SATURATION: 96 % | SYSTOLIC BLOOD PRESSURE: 132 MMHG | HEIGHT: 71 IN | RESPIRATION RATE: 16 BRPM | TEMPERATURE: 97.3 F | WEIGHT: 213.03 LBS

## 2019-05-12 LAB
ANION GAP SERPL CALCULATED.3IONS-SCNC: 11 MMOL/L (ref 3–16)
BASOPHILS ABSOLUTE: 0 K/UL (ref 0–0.2)
BASOPHILS RELATIVE PERCENT: 0 %
BUN BLDV-MCNC: 25 MG/DL (ref 7–20)
CALCIUM SERPL-MCNC: 8.6 MG/DL (ref 8.3–10.6)
CHLORIDE BLD-SCNC: 107 MMOL/L (ref 99–110)
CO2: 22 MMOL/L (ref 21–32)
CREAT SERPL-MCNC: 0.7 MG/DL (ref 0.8–1.3)
EOSINOPHILS ABSOLUTE: 0 K/UL (ref 0–0.6)
EOSINOPHILS RELATIVE PERCENT: 0 %
GFR AFRICAN AMERICAN: >60
GFR NON-AFRICAN AMERICAN: >60
GLUCOSE BLD-MCNC: 177 MG/DL (ref 70–99)
HCT VFR BLD CALC: 29.6 % (ref 40.5–52.5)
HEMOGLOBIN: 10.3 G/DL (ref 13.5–17.5)
IGE: 339 KU/L
LYMPHOCYTES ABSOLUTE: 1 K/UL (ref 1–5.1)
LYMPHOCYTES RELATIVE PERCENT: 10.6 %
MCH RBC QN AUTO: 32.7 PG (ref 26–34)
MCHC RBC AUTO-ENTMCNC: 34.8 G/DL (ref 31–36)
MCV RBC AUTO: 94 FL (ref 80–100)
MONOCYTES ABSOLUTE: 0.3 K/UL (ref 0–1.3)
MONOCYTES RELATIVE PERCENT: 3 %
NEUTROPHILS ABSOLUTE: 8.1 K/UL (ref 1.7–7.7)
NEUTROPHILS RELATIVE PERCENT: 86.4 %
PDW BLD-RTO: 13.6 % (ref 12.4–15.4)
PLATELET # BLD: 324 K/UL (ref 135–450)
PMV BLD AUTO: 7.3 FL (ref 5–10.5)
POTASSIUM REFLEX MAGNESIUM: 4.3 MMOL/L (ref 3.5–5.1)
RBC # BLD: 3.15 M/UL (ref 4.2–5.9)
SODIUM BLD-SCNC: 140 MMOL/L (ref 136–145)
WBC # BLD: 9.4 K/UL (ref 4–11)

## 2019-05-12 PROCEDURE — 6360000002 HC RX W HCPCS: Performed by: INTERNAL MEDICINE

## 2019-05-12 PROCEDURE — 2500000003 HC RX 250 WO HCPCS: Performed by: INTERNAL MEDICINE

## 2019-05-12 PROCEDURE — 6370000000 HC RX 637 (ALT 250 FOR IP): Performed by: INTERNAL MEDICINE

## 2019-05-12 PROCEDURE — 85025 COMPLETE CBC W/AUTO DIFF WBC: CPT

## 2019-05-12 PROCEDURE — 80048 BASIC METABOLIC PNL TOTAL CA: CPT

## 2019-05-12 PROCEDURE — 2580000003 HC RX 258: Performed by: INTERNAL MEDICINE

## 2019-05-12 PROCEDURE — 36415 COLL VENOUS BLD VENIPUNCTURE: CPT

## 2019-05-12 RX ORDER — DIPHENHYDRAMINE HCL 25 MG
25 CAPSULE ORAL EVERY 6 HOURS
Qty: 20 CAPSULE | Refills: 0 | Status: SHIPPED | OUTPATIENT
Start: 2019-05-12 | End: 2019-05-17

## 2019-05-12 RX ORDER — FAMOTIDINE 20 MG/1
20 TABLET, FILM COATED ORAL 2 TIMES DAILY
Qty: 10 TABLET | Refills: 0 | Status: SHIPPED | OUTPATIENT
Start: 2019-05-12 | End: 2021-05-19

## 2019-05-12 RX ORDER — PREDNISONE 50 MG/1
50 TABLET ORAL DAILY
Qty: 5 TABLET | Refills: 0 | Status: SHIPPED | OUTPATIENT
Start: 2019-05-12 | End: 2019-05-17

## 2019-05-12 RX ORDER — CLOBETASOL PROPIONATE 0.5 MG/G
CREAM TOPICAL
Qty: 60 G | Refills: 0 | Status: SHIPPED | OUTPATIENT
Start: 2019-05-12 | End: 2021-05-19

## 2019-05-12 RX ADMIN — DIPHENHYDRAMINE HYDROCHLORIDE 12.5 MG: 50 INJECTION, SOLUTION INTRAMUSCULAR; INTRAVENOUS at 00:28

## 2019-05-12 RX ADMIN — LEVOTHYROXINE SODIUM 75 MCG: 75 TABLET ORAL at 05:32

## 2019-05-12 RX ADMIN — PANTOPRAZOLE SODIUM 40 MG: 40 TABLET, DELAYED RELEASE ORAL at 05:32

## 2019-05-12 RX ADMIN — APIXABAN 2.5 MG: 5 TABLET, FILM COATED ORAL at 07:24

## 2019-05-12 RX ADMIN — Medication 10 ML: at 07:25

## 2019-05-12 RX ADMIN — METHYLPREDNISOLONE SODIUM SUCCINATE 40 MG: 40 INJECTION, POWDER, FOR SOLUTION INTRAMUSCULAR; INTRAVENOUS at 00:27

## 2019-05-12 RX ADMIN — DIPHENHYDRAMINE HYDROCHLORIDE 12.5 MG: 50 INJECTION, SOLUTION INTRAMUSCULAR; INTRAVENOUS at 05:32

## 2019-05-12 RX ADMIN — METHYLPREDNISOLONE SODIUM SUCCINATE 40 MG: 40 INJECTION, POWDER, FOR SOLUTION INTRAMUSCULAR; INTRAVENOUS at 05:32

## 2019-05-12 RX ADMIN — CLOBETASOL PROPIONATE: 0.5 CREAM TOPICAL at 07:24

## 2019-05-12 RX ADMIN — FAMOTIDINE 20 MG: 10 INJECTION, SOLUTION INTRAVENOUS at 07:24

## 2019-05-12 NOTE — PROGRESS NOTES
Pt is alert and oriented x4. Able to follow commands throughout discharge. Pt discharged home with paperwork and belongings. Verbalized understanding of instructions and complications. Verbalized when and why to follow up with MD. Given prescriptions. No questions when prompted for them to voice concern or to clarify any confusion. Pt satisfied with care and verbalized an understanding of what to expect at home.

## 2019-05-14 LAB
BLOOD CULTURE, ROUTINE: NORMAL
CULTURE, BLOOD 2: NORMAL

## 2019-06-08 PROBLEM — E86.0 DEHYDRATION: Status: RESOLVED | Noted: 2019-05-09 | Resolved: 2019-06-08

## 2019-07-08 NOTE — PROGRESS NOTES
Morning assessment complete, pedal pulses palpable. SUNDEEP hose in place. Strong reflexes. Spots on bilateral thumbs from rash are improving. No swelling noted to hands. VSS. Pt demonstrated how to reach nurse with call light. Call light in reach. Will continue to monitor. The care plan and education has been reviewed and mutually agreed upon with the patient. Wife at bedside. No further needs at this time, pt independent in room. < 4,000 OR > 12,000

## 2021-04-13 ENCOUNTER — IMMUNIZATION (OUTPATIENT)
Dept: FAMILY MEDICINE CLINIC | Age: 66
End: 2021-04-13
Payer: MEDICARE

## 2021-04-13 PROCEDURE — 91300 COVID-19, PFIZER VACCINE 30MCG/0.3ML DOSE: CPT | Performed by: NURSE PRACTITIONER

## 2021-04-13 PROCEDURE — 0001A COVID-19, PFIZER VACCINE 30MCG/0.3ML DOSE: CPT | Performed by: NURSE PRACTITIONER

## 2021-05-04 ENCOUNTER — IMMUNIZATION (OUTPATIENT)
Dept: FAMILY MEDICINE CLINIC | Age: 66
End: 2021-05-04
Payer: MEDICARE

## 2021-05-04 PROCEDURE — 0002A COVID-19, PFIZER VACCINE 30MCG/0.3ML DOSE: CPT | Performed by: FAMILY MEDICINE

## 2021-05-04 PROCEDURE — 91300 COVID-19, PFIZER VACCINE 30MCG/0.3ML DOSE: CPT | Performed by: FAMILY MEDICINE

## 2021-05-16 PROBLEM — D68.9 COAGULOPATHY (HCC): Status: RESOLVED | Noted: 2019-05-09 | Resolved: 2021-05-16

## 2021-05-18 ENCOUNTER — TELEPHONE (OUTPATIENT)
Dept: PRIMARY CARE CLINIC | Age: 66
End: 2021-05-18

## 2021-05-18 PROBLEM — T78.40XA ALLERGIC DRUG REACTION: Status: RESOLVED | Noted: 2019-05-09 | Resolved: 2021-05-18

## 2021-05-18 PROBLEM — T78.40XA ALLERGIC REACTION CAUSED BY A DRUG: Status: RESOLVED | Noted: 2019-05-09 | Resolved: 2021-05-18

## 2021-05-18 PROBLEM — I95.9 HYPOTENSION: Status: RESOLVED | Noted: 2019-05-09 | Resolved: 2021-05-18

## 2021-05-18 PROBLEM — I10 HTN (HYPERTENSION): Chronic | Status: ACTIVE | Noted: 2019-05-09

## 2021-05-18 PROBLEM — E87.1 HYPONATREMIA: Status: RESOLVED | Noted: 2019-05-09 | Resolved: 2021-05-18

## 2021-05-18 PROBLEM — T78.3XXA ANGIOEDEMA: Status: RESOLVED | Noted: 2019-05-10 | Resolved: 2021-05-18

## 2021-05-18 PROBLEM — D72.829 LEUKOCYTOSIS: Status: RESOLVED | Noted: 2019-05-09 | Resolved: 2021-05-18

## 2021-05-18 PROBLEM — Z78.9 FAILURE OF OUTPATIENT TREATMENT: Status: RESOLVED | Noted: 2019-05-09 | Resolved: 2021-05-18

## 2021-05-18 NOTE — PROGRESS NOTES
Medicare Annual Wellness Visit  Name: Vitaliy Santillan Date: 2021   MRN: [id number] Sex: Male   Age: 77 y.o. Ethnicity: Non-/Non    : 1955 Race: Salima Maurice is here for Medicare AWV  Follow-up of medical issues as well as symptoms and legs. Patient reports several month history of several symptoms in his legs. He notes pain in bilateral legs extending from thigh to his lower leg. He notes numbness in his right foot on occasion. He notes overall weakness in his right leg with difficulty balancing. No known trauma or injury. Denies back pain. No change of bowel or bladder control. No saddle anesthesia. He is able to complete some activities without difficulty as in his regular walks. He notes the symptoms occur mostly when he has been sleeping and gets up to use the bathroom in the night or if he has been seated for a long period of time and goes to stand up. He does deny upper extremity weakness. Hypothyroid-has been stable on current replacement dose for some time. Denies symptoms other than occasional fatigue  Prediabetes- denies increased thirst or urination  Hypertension-blood pressure controlled when he checks at home. No difficulty with amlodipine. Denies chest pain, headache, edema    Screenings for behavioral, psychosocial and functional/safety risks, and cognitive dysfunction are all negative except as indicated below. These results, as well as other patient data from the [address] form, are documented in Flowsheets linked to this Encounter. Allergies   Allergen Reactions    Lisinopril Swelling    Aspirin Hives and Itching   e   Prior to Visit Medications    Medication Sig Taking?  Authorizing Provider   amLODIPine (NORVASC) 10 MG tablet Take 1 tablet by mouth daily Yes Jose Sawyer MD   levothyroxine (SYNTHROID) 75 MCG tablet Take 1 tablet by mouth Daily Yes Jose Sawyer MD   (NORVASC) 10 MG tablet Take 1 tablet by mouth daily Yes Wyatt Lugo MD   levothyroxine (SYNTHROID) 75 MCG tablet Take 1 tablet by mouth Daily Yes Wyatt Lugo MD      Diagnosis Date    Allergic drug reaction 05/09/2019    Angioedema 05/10/2019    Benign prostatic hyperplasia without lower urinary tract symptoms     Elevated PSA     Hypertension     Thyroid disease      Past Surgical History:   Procedure Laterality Date    CHOLECYSTECTOMY      JOINT REPLACEMENT      KNEE SURGERY      PROSTATE BIOPSY         Family History   Problem Relation Age of Onset    Diabetes Mother     Diabetes Father        CareTeam (Including outside providers/suppliers regularly involved in providing care):   Patient Care Team:  Wyatt Lugo MD as PCP - General (Family Medicine)  Wyatt Lugo [facility]    Wt Readings from Last 3 Encounters:   05/19/21 284 lb (128.8 kg)   05/10/19 213 lb 0.4 oz (96.6 kg)   05/08/19 215 lb (97.5 kg)     Vitals:    05/19/21 0801   BP: 132/82   Site: Right Upper Arm   Position: Sitting   Cuff Size: Large Adult   Pulse: 88   SpO2: 97%   Weight: 284 lb (128.8 kg)   Height: 5' 11\" (1.803 m)     Body mass index is 39.61 kg/m². Based upon direct observation of the patient, evaluation of cognition reveals remote memory intact, recent memory impaired. Physical Exam  Vitals reviewed. Constitutional:       Appearance: Normal appearance. HENT:      Head: Normocephalic and atraumatic. Right Ear: Tympanic membrane, ear canal and external ear normal.      Left Ear: Tympanic membrane, ear canal and external ear normal.      Nose: Nose normal.      Mouth/Throat:      Mouth: Mucous membranes are moist.      Pharynx: Oropharynx is clear. Eyes:      General: No scleral icterus. Extraocular Movements: Extraocular movements intact. Conjunctiva/sclera: Conjunctivae normal.      Pupils: Pupils are equal, round, and reactive to light. Neck:      Thyroid: No thyroid mass, thyromegaly or thyroid tenderness. Increased Pain, New or Increased Fatigue, Loneliness, Social Isolation, Stress or Anger?: None of These  Do you get the social and emotional support that you need?: Yes  Do you have a Living Will?: (!) No  Advance Directives     Power of  Living Will ACP-Advance Directive ACP-Power of     Not on File Not on File Not on File Not on File      General Health Risk Interventions:  · Current issues with leg pain as discussed previously    Health Habits/Nutrition:  Health Habits/Nutrition  Do you exercise for at least 20 minutes 2-3 times per week?: Yes  Have you lost any weight without trying in the past 3 months?: No  Do you eat only one meal per day?: No  Have you seen the dentist within the past year?: (!) No  Body mass index: (!) 39.61  Health Habits/Nutrition Interventions:  · Continue regular exercise     Safety:  Safety  Do you have working smoke detectors?: Yes  Have all throw rugs been removed or fastened?: (!) No  Do you have non-slip mats or surfaces in all bathtubs/showers?: (!) No  Do all of your stairways have a railing or banister?: Yes  Are your doorways, halls and stairs free of clutter?: Yes  Do you always fasten your seatbelt when you are in a car?: Yes  Safety Interventions:  · Home safety tips provided       Personalized Preventive Plan   Current Health Maintenance Status  Immunization History   Administered Date(s) Administered    COVID-19, Franco Peter, PF, 30mcg/0.3mL 04/13/2021, 05/04/2021      Health Maintenance   Topic Date Due    TSH testing  Never done    Hepatitis C screen  Never done    DTaP/Tdap/Td vaccine (1 - Tdap) Never done    Lipid screen  Never done    Shingles Vaccine (1 of 2) Never done    Colon cancer screen colonoscopy  Never done    Pneumococcal 65+ years Vaccine (1 of 1 - PPSV23) Never done   [employer] Visit (AWV)  Never done    Flu vaccine (Season Ended) 09/01/2021    A1C test (Diabetic or Prediabetic)  05/19/2022    COVID-19 Vaccine  Completed    Hepatitis A vaccine  Aged Out    Hepatitis B vaccine  Aged Out    Hib vaccine  Aged Out    Meningococcal (ACWY) vaccine  Aged Out     Recommendations for [place] Due: see orders and patient instructions/AVS.    1. Routine general medical examination at a health care facility  Do recommend all vaccinations including tetanus, shingles, Pneumovax. Patient has completed Covid vaccine. Do recommend colon cancer screening. Referral for colonoscopy sent. 2. Prediabetes  Hemoglobin A1c 6.1 in office today. Do continue a low-carb diet increasing exercise to work towards weight loss  - POCT glycosylated hemoglobin (Hb A1C)    3. Acquired hypothyroidism  Assess labs and adjust dose as needed  - TSH with Reflex  - amLODIPine (NORVASC) 10 MG tablet; Take 1 tablet by mouth daily  Dispense: 90 tablet; Refill: 1    4. Essential hypertension  Blood pressure is well controlled. Continue medication regimen. Recommend home blood pressure monitoring. Recommend low sodium diet, at least 150 minutes of cardiovascular exercise each week. Recommend weight loss. - Comprehensive Metabolic Panel  - Lipid Panel  - CBC Auto Differential    5. Numbness in both legs  Concerned about symptoms of pain, numbness recommend EMG for further evaluation.  - [place] Lee MD (Inpatient and Outpatient EMG), [facility]  - Vitamin B12 & Folate    6. Leg pain, bilateral    - [place] Lee MD (Inpatient and Outpatient EMG)[facility]  - SEDIMENTATION RATE    7. Encounter for hepatitis C screening test for low risk patient    - Hepatitis C Antibody  - levothyroxine (SYNTHROID) 75 MCG tablet; Take 1 tablet by mouth Daily  Dispense: 90 tablet; Refill: 1    8. Screening for malignant neoplasm of colon    - [facility] - Rose Marie Gonzalez MD, Gastroenterology, [address] [place] [address] Dr Chan  after EMG, for Brii Visit in 1 year.     Recommended screening schedule for the next 5-10 years is provided to the patient in written form: see Patient Instructions/AVS.    Electronically signed by Junior Ray MD on 5/19/21 at 10:27 AM.

## 2021-05-19 ENCOUNTER — OFFICE VISIT (OUTPATIENT)
Dept: PRIMARY CARE CLINIC | Age: 66
End: 2021-05-19
Payer: MEDICARE

## 2021-05-19 ENCOUNTER — PROCEDURE VISIT (OUTPATIENT)
Dept: NEUROLOGY | Age: 66
End: 2021-05-19
Payer: MEDICARE

## 2021-05-19 VITALS
BODY MASS INDEX: 39.76 KG/M2 | WEIGHT: 284 LBS | SYSTOLIC BLOOD PRESSURE: 132 MMHG | DIASTOLIC BLOOD PRESSURE: 82 MMHG | HEART RATE: 88 BPM | HEIGHT: 71 IN | OXYGEN SATURATION: 97 %

## 2021-05-19 DIAGNOSIS — Z12.11 SCREENING FOR MALIGNANT NEOPLASM OF COLON: ICD-10-CM

## 2021-05-19 DIAGNOSIS — R20.0 BILATERAL LEG NUMBNESS: Primary | ICD-10-CM

## 2021-05-19 DIAGNOSIS — Z11.59 ENCOUNTER FOR HEPATITIS C SCREENING TEST FOR LOW RISK PATIENT: ICD-10-CM

## 2021-05-19 DIAGNOSIS — I10 ESSENTIAL HYPERTENSION: ICD-10-CM

## 2021-05-19 DIAGNOSIS — R73.03 PREDIABETES: ICD-10-CM

## 2021-05-19 DIAGNOSIS — R20.0 NUMBNESS IN BOTH LEGS: ICD-10-CM

## 2021-05-19 DIAGNOSIS — M79.605 LEG PAIN, BILATERAL: ICD-10-CM

## 2021-05-19 DIAGNOSIS — Z00.00 ROUTINE GENERAL MEDICAL EXAMINATION AT A HEALTH CARE FACILITY: Primary | ICD-10-CM

## 2021-05-19 DIAGNOSIS — M79.604 LEG PAIN, BILATERAL: ICD-10-CM

## 2021-05-19 DIAGNOSIS — E03.9 ACQUIRED HYPOTHYROIDISM: ICD-10-CM

## 2021-05-19 PROBLEM — M17.9 OSTEOARTHRITIS OF KNEE: Status: ACTIVE | Noted: 2019-02-13

## 2021-05-19 LAB
A/G RATIO: 1.7 (ref 1.1–2.2)
ALBUMIN SERPL-MCNC: 4.8 G/DL (ref 3.4–5)
ALP BLD-CCNC: 80 U/L (ref 40–129)
ALT SERPL-CCNC: 26 U/L (ref 10–40)
ANION GAP SERPL CALCULATED.3IONS-SCNC: 12 MMOL/L (ref 3–16)
AST SERPL-CCNC: 26 U/L (ref 15–37)
BASOPHILS ABSOLUTE: 0.1 K/UL (ref 0–0.2)
BASOPHILS RELATIVE PERCENT: 1.5 %
BILIRUB SERPL-MCNC: 0.9 MG/DL (ref 0–1)
BUN BLDV-MCNC: 15 MG/DL (ref 7–20)
CALCIUM SERPL-MCNC: 9.8 MG/DL (ref 8.3–10.6)
CHLORIDE BLD-SCNC: 100 MMOL/L (ref 99–110)
CHOLESTEROL, TOTAL: 177 MG/DL (ref 0–199)
CO2: 26 MMOL/L (ref 21–32)
CREAT SERPL-MCNC: 0.8 MG/DL (ref 0.8–1.3)
EOSINOPHILS ABSOLUTE: 0 K/UL (ref 0–0.6)
EOSINOPHILS RELATIVE PERCENT: 0.7 %
FOLATE: 15.61 NG/ML (ref 4.78–24.2)
GFR AFRICAN AMERICAN: >60
GFR NON-AFRICAN AMERICAN: >60
GLOBULIN: 2.9 G/DL
GLUCOSE BLD-MCNC: 107 MG/DL (ref 70–99)
HBA1C MFR BLD: 6.1 %
HCT VFR BLD CALC: 45.8 % (ref 40.5–52.5)
HDLC SERPL-MCNC: 45 MG/DL (ref 40–60)
HEMOGLOBIN: 15.7 G/DL (ref 13.5–17.5)
HEPATITIS C ANTIBODY INTERPRETATION: NORMAL
LDL CHOLESTEROL CALCULATED: 96 MG/DL
LYMPHOCYTES ABSOLUTE: 1.7 K/UL (ref 1–5.1)
LYMPHOCYTES RELATIVE PERCENT: 35.8 %
MCH RBC QN AUTO: 32.4 PG (ref 26–34)
MCHC RBC AUTO-ENTMCNC: 34.3 G/DL (ref 31–36)
MCV RBC AUTO: 94.5 FL (ref 80–100)
MONOCYTES ABSOLUTE: 0.5 K/UL (ref 0–1.3)
MONOCYTES RELATIVE PERCENT: 10 %
NEUTROPHILS ABSOLUTE: 2.5 K/UL (ref 1.7–7.7)
NEUTROPHILS RELATIVE PERCENT: 52 %
PDW BLD-RTO: 14.1 % (ref 12.4–15.4)
PLATELET # BLD: 182 K/UL (ref 135–450)
PMV BLD AUTO: 8.8 FL (ref 5–10.5)
POTASSIUM SERPL-SCNC: 4.5 MMOL/L (ref 3.5–5.1)
RBC # BLD: 4.85 M/UL (ref 4.2–5.9)
SODIUM BLD-SCNC: 138 MMOL/L (ref 136–145)
TOTAL PROTEIN: 7.7 G/DL (ref 6.4–8.2)
TRIGL SERPL-MCNC: 179 MG/DL (ref 0–150)
TSH REFLEX: 2.3 UIU/ML (ref 0.27–4.2)
VITAMIN B-12: 663 PG/ML (ref 211–911)
VLDLC SERPL CALC-MCNC: 36 MG/DL
WBC # BLD: 4.8 K/UL (ref 4–11)

## 2021-05-19 PROCEDURE — 3017F COLORECTAL CA SCREEN DOC REV: CPT | Performed by: FAMILY MEDICINE

## 2021-05-19 PROCEDURE — 95909 NRV CNDJ TST 5-6 STUDIES: CPT | Performed by: PSYCHIATRY & NEUROLOGY

## 2021-05-19 PROCEDURE — 1036F TOBACCO NON-USER: CPT | Performed by: FAMILY MEDICINE

## 2021-05-19 PROCEDURE — 4040F PNEUMOC VAC/ADMIN/RCVD: CPT | Performed by: FAMILY MEDICINE

## 2021-05-19 PROCEDURE — 36415 COLL VENOUS BLD VENIPUNCTURE: CPT | Performed by: FAMILY MEDICINE

## 2021-05-19 PROCEDURE — G8417 CALC BMI ABV UP PARAM F/U: HCPCS | Performed by: FAMILY MEDICINE

## 2021-05-19 PROCEDURE — 83036 HEMOGLOBIN GLYCOSYLATED A1C: CPT | Performed by: FAMILY MEDICINE

## 2021-05-19 PROCEDURE — 1123F ACP DISCUSS/DSCN MKR DOCD: CPT | Performed by: FAMILY MEDICINE

## 2021-05-19 PROCEDURE — 99214 OFFICE O/P EST MOD 30 MIN: CPT | Performed by: FAMILY MEDICINE

## 2021-05-19 PROCEDURE — G0439 PPPS, SUBSEQ VISIT: HCPCS | Performed by: FAMILY MEDICINE

## 2021-05-19 PROCEDURE — 95886 MUSC TEST DONE W/N TEST COMP: CPT | Performed by: PSYCHIATRY & NEUROLOGY

## 2021-05-19 PROCEDURE — G8427 DOCREV CUR MEDS BY ELIG CLIN: HCPCS | Performed by: FAMILY MEDICINE

## 2021-05-19 RX ORDER — LEVOTHYROXINE SODIUM 0.07 MG/1
75 TABLET ORAL DAILY
Qty: 90 TABLET | Refills: 1 | Status: SHIPPED | OUTPATIENT
Start: 2021-05-19 | End: 2021-12-15 | Stop reason: SDUPTHER

## 2021-05-19 RX ORDER — AMLODIPINE BESYLATE 10 MG/1
10 TABLET ORAL DAILY
Qty: 90 TABLET | Refills: 1 | Status: SHIPPED | OUTPATIENT
Start: 2021-05-19 | End: 2021-12-10 | Stop reason: SDUPTHER

## 2021-05-19 RX ORDER — AMLODIPINE BESYLATE 10 MG/1
10 TABLET ORAL DAILY
COMMUNITY
End: 2021-05-19 | Stop reason: SDUPTHER

## 2021-05-19 SDOH — ECONOMIC STABILITY: FOOD INSECURITY: WITHIN THE PAST 12 MONTHS, YOU WORRIED THAT YOUR FOOD WOULD RUN OUT BEFORE YOU GOT MONEY TO BUY MORE.: NEVER TRUE

## 2021-05-19 SDOH — ECONOMIC STABILITY: FOOD INSECURITY: WITHIN THE PAST 12 MONTHS, THE FOOD YOU BOUGHT JUST DIDN'T LAST AND YOU DIDN'T HAVE MONEY TO GET MORE.: NEVER TRUE

## 2021-05-19 ASSESSMENT — PATIENT HEALTH QUESTIONNAIRE - PHQ9
SUM OF ALL RESPONSES TO PHQ QUESTIONS 1-9: 0
SUM OF ALL RESPONSES TO PHQ9 QUESTIONS 1 & 2: 0
1. LITTLE INTEREST OR PLEASURE IN DOING THINGS: 0
SUM OF ALL RESPONSES TO PHQ QUESTIONS 1-9: 0

## 2021-05-19 ASSESSMENT — LIFESTYLE VARIABLES
AUDIT-C TOTAL SCORE: INCOMPLETE
HOW OFTEN DO YOU HAVE A DRINK CONTAINING ALCOHOL: NEVER
AUDIT TOTAL SCORE: INCOMPLETE

## 2021-05-19 ASSESSMENT — SOCIAL DETERMINANTS OF HEALTH (SDOH): HOW HARD IS IT FOR YOU TO PAY FOR THE VERY BASICS LIKE FOOD, HOUSING, MEDICAL CARE, AND HEATING?: NOT HARD AT ALL

## 2021-05-19 NOTE — PROGRESS NOTES
Chantel Blunt M.D. [facility]) Physicians/[facility]  Board Certified in [address] [facility] [address] [facility] [place], [address]    EMG / NERVE CONDUCTION STUDY      PATIENT:  Nicole Lozada       DATE OF EM21     YOB: 1955       REASON FOR EMG:   Bilateral leg numbness      REFERRING PHYSICIAN:  MD Peter Lewis [address]     SUMMARY:   Bilateral peroneal motor nerve studies are low amplitudes and slow conduction velocities. Bilateral posterior tibial motor nerve studies also had left with some slow conduction velocities. Bilateral superficial peroneal sensory nerve studies were not recordable. Needle EMG of several muscles in both lower extremities showed evidence of denervation in the gastrocnemius muscles bilaterally. Needle EMG of the lumbar paraspinal muscles was normal.      CLINICAL DIAGNOSIS:  Peripheral neuropathy        EMG RESULTS:   This patient has a moderately severe generalized sensorimotor mixed polyneuropathy in both lower extremities. ---------------------------------------------  Chantel Blunt M.D.   Electromyographer / Neurologist

## 2021-05-19 NOTE — PATIENT INSTRUCTIONS
Personalized Preventive Plan for Carlos Kim - 5/19/2021  Medicare offers a range of preventive health benefits. Some of the tests and screenings are paid in full while other may be subject to a deductible, co-insurance, and/or copay. Some of these benefits include a comprehensive review of your medical history including lifestyle, illnesses that may run in your family, and various assessments and screenings as appropriate. After reviewing your medical record and screening and assessments performed today your provider may have ordered immunizations, labs, imaging, and/or referrals for you. A list of these orders (if applicable) as well as your Preventive Care list are included within your After Visit Summary for your review. Other Preventive Recommendations:    · A preventive eye exam performed by an eye specialist is recommended every 1-2 years to screen for glaucoma; cataracts, macular degeneration, and other eye disorders. · A preventive dental visit is recommended every 6 months. · Try to get at least 150 minutes of exercise per week or 10,000 steps per day on a pedometer . · Order or download the FREE \"Exercise & Physical Activity: Your Everyday Guide\" from [employer]. Call 4-241.703.8989 or search [employer] online. · You need 1963-3138 mg of calcium and 6380-8872 IU of vitamin D per day. It is possible to meet your calcium requirement with diet alone, but a vitamin D supplement is usually necessary to meet this goal.  · When exposed to the sun, use a sunscreen that protects against both UVA and UVB radiation with an SPF of 30 or greater. Reapply every 2 to 3 hours or after sweating, drying off with a towel, or swimming. · Always wear a seat belt when traveling in a car. Always wear a helmet when riding a bicycle or motorcycle.

## 2021-05-20 ENCOUNTER — TELEPHONE (OUTPATIENT)
Dept: PRIMARY CARE CLINIC | Age: 66
End: 2021-05-20

## 2021-05-20 LAB — SEDIMENTATION RATE, ERYTHROCYTE: 14 MM/HR (ref 0–20)

## 2021-05-20 NOTE — TELEPHONE ENCOUNTER
----- Message from Aayush Pepe sent at 5/20/2021  8:33 AM EDT -----  Subject: Message to Provider    QUESTIONS  Information for Provider? Patients wife Coco Schroeder) called due to the EMG   that he had to see if he had nerve damage; this was performed 5/19/2021  the results will not be back until Friday. She wanted to know if patient   should be seen Friday, it would have to be after June 1st due to   traveling. She also stated that his weight is not correct. On his records   it is listed as 284lbs and she stated that he is 234lbs and would like to   have that updated. ---------------------------------------------------------------------------  --------------  Irina SMALL  What is the best way for the office to contact you? OK to leave message on   voicemail  Preferred Call Back Phone Number? [phone]  ---------------------------------------------------------------------------  --------------  SCRIPT ANSWERS  Relationship to Patient? Other  Representative Name? Marina Lyman  Is the Representative on the appropriate HIPAA document in Epic?  Yes

## 2021-06-02 ENCOUNTER — OFFICE VISIT (OUTPATIENT)
Dept: PRIMARY CARE CLINIC | Age: 66
End: 2021-06-02
Payer: MEDICARE

## 2021-06-02 VITALS
HEIGHT: 71 IN | OXYGEN SATURATION: 98 % | WEIGHT: 235 LBS | HEART RATE: 86 BPM | SYSTOLIC BLOOD PRESSURE: 128 MMHG | BODY MASS INDEX: 32.9 KG/M2 | DIASTOLIC BLOOD PRESSURE: 68 MMHG

## 2021-06-02 DIAGNOSIS — G62.9 NEUROPATHY: Primary | ICD-10-CM

## 2021-06-02 DIAGNOSIS — E78.1 PURE HYPERTRIGLYCERIDEMIA: ICD-10-CM

## 2021-06-02 PROCEDURE — 1036F TOBACCO NON-USER: CPT | Performed by: FAMILY MEDICINE

## 2021-06-02 PROCEDURE — 99213 OFFICE O/P EST LOW 20 MIN: CPT | Performed by: FAMILY MEDICINE

## 2021-06-02 PROCEDURE — G8427 DOCREV CUR MEDS BY ELIG CLIN: HCPCS | Performed by: FAMILY MEDICINE

## 2021-06-02 PROCEDURE — G8417 CALC BMI ABV UP PARAM F/U: HCPCS | Performed by: FAMILY MEDICINE

## 2021-06-02 PROCEDURE — 4040F PNEUMOC VAC/ADMIN/RCVD: CPT | Performed by: FAMILY MEDICINE

## 2021-06-02 PROCEDURE — 1123F ACP DISCUSS/DSCN MKR DOCD: CPT | Performed by: FAMILY MEDICINE

## 2021-06-02 PROCEDURE — 3017F COLORECTAL CA SCREEN DOC REV: CPT | Performed by: FAMILY MEDICINE

## 2021-07-08 ENCOUNTER — TELEPHONE (OUTPATIENT)
Dept: PRIMARY CARE CLINIC | Age: 66
End: 2021-07-08

## 2021-07-08 NOTE — TELEPHONE ENCOUNTER
----- Message from Jerica Phani sent at 7/8/2021 11:34 AM EDT -----  Subject: Message to Provider    QUESTIONS  Information for Provider? Patient want to inform Dr Genoveva Hammond that he cancel   his appointment Edmundo Colon, on 7/9/21 due to the patient join the [facility]   for water exercises classes and his foot is feeling 90% better and would   like to try the water exercise out for awhile before going to [facility] Colon.     ---------------------------------------------------------------------------  --------------  [employer]  What is the best way for the office to contact you? OK to leave message on   voicemail  Preferred Call Back Phone Number? [phone]  ---------------------------------------------------------------------------  --------------  SCRIPT ANSWERS  Relationship to Patient? Other  Representative Name? Thang Johnson   Is the Representative on the appropriate HIPAA document in Epic?  Yes

## 2021-07-08 NOTE — TELEPHONE ENCOUNTER
----- Message from Emilia Godoy sent at 7/8/2021 11:34 AM EDT -----  Subject: Message to Provider    QUESTIONS  Information for Provider? Patient want to inform Dr Bc Salguero that he cancel   his appointment Arleth Yates, on 7/9/21 due to the patient join the [facility]   for water exercises classes and his foot is feeling 90% better and would   like to try the water exercise out for awhile before going to Arleth Yates.     ---------------------------------------------------------------------------  --------------  Jinn  What is the best way for the office to contact you? OK to leave message on   voicemail  Preferred Call Back Phone Number? [phone]  ---------------------------------------------------------------------------  --------------  SCRIPT ANSWERS  Relationship to Patient? Other  Representative Name? Shantelle Fleming   Is the Representative on the appropriate HIPAA document in Epic?  Yes

## 2021-10-26 ENCOUNTER — OFFICE VISIT (OUTPATIENT)
Dept: PRIMARY CARE CLINIC | Age: 66
End: 2021-10-26
Payer: MEDICARE

## 2021-10-26 VITALS
HEART RATE: 75 BPM | WEIGHT: 226 LBS | HEIGHT: 71 IN | DIASTOLIC BLOOD PRESSURE: 88 MMHG | BODY MASS INDEX: 31.64 KG/M2 | OXYGEN SATURATION: 98 % | SYSTOLIC BLOOD PRESSURE: 140 MMHG

## 2021-10-26 DIAGNOSIS — I10 ESSENTIAL HYPERTENSION: ICD-10-CM

## 2021-10-26 DIAGNOSIS — E03.9 ACQUIRED HYPOTHYROIDISM: ICD-10-CM

## 2021-10-26 DIAGNOSIS — R73.03 PREDIABETES: ICD-10-CM

## 2021-10-26 DIAGNOSIS — Z01.818 PREOP EXAMINATION: ICD-10-CM

## 2021-10-26 DIAGNOSIS — E66.9 OBESITY (BMI 30-39.9): ICD-10-CM

## 2021-10-26 DIAGNOSIS — E78.1 PURE HYPERTRIGLYCERIDEMIA: ICD-10-CM

## 2021-10-26 DIAGNOSIS — R06.83 SNORING: ICD-10-CM

## 2021-10-26 DIAGNOSIS — M79.661 PAIN IN RIGHT LOWER LEG: ICD-10-CM

## 2021-10-26 DIAGNOSIS — M79.661 PAIN IN RIGHT LOWER LEG: Primary | ICD-10-CM

## 2021-10-26 PROBLEM — D64.9 ANEMIA: Status: RESOLVED | Noted: 2019-05-09 | Resolved: 2021-10-26

## 2021-10-26 LAB
A/G RATIO: 1.5 (ref 1.1–2.2)
ABO/RH: NORMAL
ALBUMIN SERPL-MCNC: 4.8 G/DL (ref 3.4–5)
ALP BLD-CCNC: 93 U/L (ref 40–129)
ALT SERPL-CCNC: 29 U/L (ref 10–40)
ANION GAP SERPL CALCULATED.3IONS-SCNC: 15 MMOL/L (ref 3–16)
ANTIBODY SCREEN: NORMAL
APTT: 34.4 SEC (ref 26.2–38.6)
AST SERPL-CCNC: 24 U/L (ref 15–37)
BASOPHILS ABSOLUTE: 0 K/UL (ref 0–0.2)
BASOPHILS RELATIVE PERCENT: 0.6 %
BILIRUB SERPL-MCNC: 1.2 MG/DL (ref 0–1)
BUN BLDV-MCNC: 21 MG/DL (ref 7–20)
C-REACTIVE PROTEIN: <3 MG/L (ref 0–5.1)
CALCIUM SERPL-MCNC: 9.9 MG/DL (ref 8.3–10.6)
CHLORIDE BLD-SCNC: 100 MMOL/L (ref 99–110)
CO2: 25 MMOL/L (ref 21–32)
CREAT SERPL-MCNC: 0.8 MG/DL (ref 0.8–1.3)
EOSINOPHILS ABSOLUTE: 0 K/UL (ref 0–0.6)
EOSINOPHILS RELATIVE PERCENT: 0.5 %
GFR AFRICAN AMERICAN: >60
GFR NON-AFRICAN AMERICAN: >60
GLOBULIN: 3.2 G/DL
GLUCOSE BLD-MCNC: 88 MG/DL (ref 70–99)
HCT VFR BLD CALC: 47.6 % (ref 40.5–52.5)
HEMOGLOBIN: 15.9 G/DL (ref 13.5–17.5)
INR BLD: 1.08 (ref 0.88–1.12)
LYMPHOCYTES ABSOLUTE: 2 K/UL (ref 1–5.1)
LYMPHOCYTES RELATIVE PERCENT: 38.5 %
MCH RBC QN AUTO: 31.4 PG (ref 26–34)
MCHC RBC AUTO-ENTMCNC: 33.5 G/DL (ref 31–36)
MCV RBC AUTO: 93.8 FL (ref 80–100)
MONOCYTES ABSOLUTE: 0.5 K/UL (ref 0–1.3)
MONOCYTES RELATIVE PERCENT: 9.2 %
NEUTROPHILS ABSOLUTE: 2.6 K/UL (ref 1.7–7.7)
NEUTROPHILS RELATIVE PERCENT: 51.2 %
PDW BLD-RTO: 14.2 % (ref 12.4–15.4)
PLATELET # BLD: 181 K/UL (ref 135–450)
PMV BLD AUTO: 8.6 FL (ref 5–10.5)
POTASSIUM SERPL-SCNC: 4.6 MMOL/L (ref 3.5–5.1)
PROTHROMBIN TIME: 12.3 SEC (ref 9.9–12.7)
RBC # BLD: 5.07 M/UL (ref 4.2–5.9)
SEDIMENTATION RATE, ERYTHROCYTE: 21 MM/HR (ref 0–20)
SODIUM BLD-SCNC: 140 MMOL/L (ref 136–145)
TOTAL PROTEIN: 8 G/DL (ref 6.4–8.2)
WBC # BLD: 5.2 K/UL (ref 4–11)

## 2021-10-26 PROCEDURE — 1036F TOBACCO NON-USER: CPT | Performed by: FAMILY MEDICINE

## 2021-10-26 PROCEDURE — 3017F COLORECTAL CA SCREEN DOC REV: CPT | Performed by: FAMILY MEDICINE

## 2021-10-26 PROCEDURE — G8484 FLU IMMUNIZE NO ADMIN: HCPCS | Performed by: FAMILY MEDICINE

## 2021-10-26 PROCEDURE — 99214 OFFICE O/P EST MOD 30 MIN: CPT | Performed by: FAMILY MEDICINE

## 2021-10-26 PROCEDURE — 93000 ELECTROCARDIOGRAM COMPLETE: CPT | Performed by: FAMILY MEDICINE

## 2021-10-26 PROCEDURE — 1123F ACP DISCUSS/DSCN MKR DOCD: CPT | Performed by: FAMILY MEDICINE

## 2021-10-26 PROCEDURE — G8417 CALC BMI ABV UP PARAM F/U: HCPCS | Performed by: FAMILY MEDICINE

## 2021-10-26 PROCEDURE — G8427 DOCREV CUR MEDS BY ELIG CLIN: HCPCS | Performed by: FAMILY MEDICINE

## 2021-10-26 PROCEDURE — 4040F PNEUMOC VAC/ADMIN/RCVD: CPT | Performed by: FAMILY MEDICINE

## 2021-10-26 NOTE — PROGRESS NOTES
Preoperative Consultation    Date of Service: 10/26/2021   Patient: Dustin Pritchard  YOB: 1955     This patient presents to the office today for a preoperative consultation at the request of surgeon, , who plans on performing revision right total knee arthroplasty on November 2. Hypertension - well controlled at home 120/80s. No doffocituly with medica    Hypothyroidism- on replaclement. 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 no current medication     Planned anesthesia: General   Known anesthesia problems: None   Bleeding risk: No recent or remote history of abnormal bleeding  Personal or FH ofDVT/PE: No    Personal History of Snoring and/or Sleep Apnea: yes, has not completed sleep study    Patient Active Problem List   Diagnosis    Essential hypertension    Obesity (BMI 30-39. 9)    Hypothyroid    Overweight    Osteoarthritis of knee    Prediabetes    Pure hypertriglyceridemia    Snoring     Past Surgical History:   Procedure Laterality Date    CHOLECYSTECTOMY      ELBOW SURGERY      JOINT REPLACEMENT      KNEE SURGERY      PROSTATE BIOPSY       Allergies   Allergen Reactions    Lisinopril Swelling    Aspirin Hives and Itching     Current Outpatient Medications   Medication Sig Dispense Refill    amLODIPine (NORVASC) 10 MG tablet Take 1 tablet by mouth daily 90 tablet 1    levothyroxine (SYNTHROID) 75 MCG tablet Take 1 tablet by mouth Daily 90 tablet 1     No current facility-administered medications for this visit.       Social History     Tobacco Use    Smoking status: Never Smoker    Smokeless tobacco: Never Used   Substance Use Topics    Alcohol use: Never     Family History   Problem Relation Age of Onset    Diabetes Mother     Diabetes Father        Review of Systems  Review of Systems   All other systems reviewed and are negative. Recent Labs:  CBC:   Lab Results   Component Value Date    WBC 4.8 05/19/2021    HGB 15.7 05/19/2021    HCT 45.8 05/19/2021    MCH 32.4 05/19/2021    MCHC 34.3 05/19/2021    RDW 14.1 05/19/2021     05/19/2021    MPV 8.8 05/19/2021     CMP:   Lab Results   Component Value Date     05/19/2021    K 4.5 05/19/2021    K 4.3 05/12/2019     05/19/2021    CO2 26 05/19/2021    ANIONGAP 12 05/19/2021    GLUCOSE 107 05/19/2021    BUN 15 05/19/2021    CREATININE 0.8 05/19/2021    GFRAA >60 05/19/2021    CALCIUM 9.8 05/19/2021    PROT 7.7 05/19/2021    LABALBU 4.8 05/19/2021    AGRATIO 1.7 05/19/2021    BILITOT 0.9 05/19/2021    ALKPHOS 80 05/19/2021    ALT 26 05/19/2021    AST 26 05/19/2021    GLOB 2.9 05/19/2021     HBA1C:  Lab Results   Component Value Date    LABA1C 6.1 05/19/2021       OBJECTIVE:   BP (!) 140/88   Pulse 75   Ht 5' 11\" (1.803 m)   Wt 226 lb (102.5 kg)   SpO2 98%   BMI 31.52 kg/m²  Weight: 226 lb (102.5 kg)   Physical Exam  Vitals reviewed. Constitutional:       Appearance: Normal appearance. HENT:      Head: Normocephalic and atraumatic. Right Ear: Tympanic membrane, ear canal and external ear normal.      Left Ear: Tympanic membrane, ear canal and external ear normal.      Nose: Nose normal.      Mouth/Throat:      Mouth: Mucous membranes are moist.      Pharynx: Oropharynx is clear. Eyes:      General: No scleral icterus. Extraocular Movements: Extraocular movements intact. Conjunctiva/sclera: Conjunctivae normal.      Pupils: Pupils are equal, round, and reactive to light. Neck:      Thyroid: No thyroid mass, thyromegaly or thyroid tenderness. Cardiovascular:      Rate and Rhythm: Normal rate and regular rhythm. Pulses: Normal pulses. Heart sounds: Normal heart sounds. Pulmonary:      Effort: Pulmonary effort is normal.      Breath sounds: Normal breath sounds.  No wheezing, rhonchi or rales. Abdominal:      Palpations: Abdomen is soft. Tenderness: There is no abdominal tenderness. There is no guarding or rebound. Musculoskeletal:      Cervical back: Neck supple. No rigidity. No muscular tenderness. Right lower leg: No edema. Left lower leg: No edema. Lymphadenopathy:      Cervical: No cervical adenopathy. Skin:     General: Skin is warm and dry. Findings: No rash. Neurological:      General: No focal deficit present. Mental Status: He is alert and oriented to person, place, and time. Cranial Nerves: No cranial nerve deficit. Sensory: No sensory deficit. Motor: No weakness. Coordination: Coordination normal.      Gait: Gait normal.   Psychiatric:         Mood and Affect: Mood normal.         Behavior: Behavior normal.         Thought Content: Thought content normal.         Judgment: Judgment normal.         EKG Interpretation:  normal EKG, normal sinus rhythm. ASSESSMENT:  Kala Lowe was seen today for pre-op exam.    Diagnoses and all orders for this visit:    Pain in right lower leg   -     APTT; Future  -     TYPE AND SCREEN; Future  -     PROTIME-INR; Future  -     SEDIMENTATION RATE; Future  -     C-REACTIVE PROTEIN; Future    Essential hypertension  -     EKG 12 Lead  -     CBC WITH AUTO DIFFERENTIAL; Future  -     COMPREHENSIVE METABOLIC PANEL; Future    Pure hypertriglyceridemia    Prediabetes  -     Hemoglobin A1C; Future    Acquired hypothyroidism     Snoring     obesity    Preop examination            74 y.o. patient approved for Surgery      PLAN:     1. Preoperative workup as follows: ECG, see lab orders  2. Change in medication regimen before surgery:None  3.  No contraindications to planned surgery    Electronically signed by Chuy Yanes MD on 10/26/21 at 12:12 PM.

## 2021-10-27 LAB
ESTIMATED AVERAGE GLUCOSE: 122.6 MG/DL
HBA1C MFR BLD: 5.9 %

## 2021-11-01 ENCOUNTER — ANESTHESIA EVENT (OUTPATIENT)
Dept: OPERATING ROOM | Age: 66
DRG: 468 | End: 2021-11-01
Payer: MEDICARE

## 2021-11-02 ENCOUNTER — ANESTHESIA (OUTPATIENT)
Dept: OPERATING ROOM | Age: 66
DRG: 468 | End: 2021-11-02
Payer: MEDICARE

## 2021-11-02 ENCOUNTER — APPOINTMENT (OUTPATIENT)
Dept: GENERAL RADIOLOGY | Age: 66
DRG: 468 | End: 2021-11-02
Attending: ORTHOPAEDIC SURGERY
Payer: MEDICARE

## 2021-11-02 ENCOUNTER — HOSPITAL ENCOUNTER (INPATIENT)
Age: 66
LOS: 1 days | Discharge: HOME | DRG: 468 | End: 2021-11-03
Attending: ORTHOPAEDIC SURGERY | Admitting: ORTHOPAEDIC SURGERY
Payer: MEDICARE

## 2021-11-02 VITALS — DIASTOLIC BLOOD PRESSURE: 70 MMHG | SYSTOLIC BLOOD PRESSURE: 104 MMHG | OXYGEN SATURATION: 97 %

## 2021-11-02 DIAGNOSIS — T84.032S MECHANICAL LOOSENING OF INTERNAL RIGHT KNEE PROSTHETIC JOINT, SEQUELA: Primary | ICD-10-CM

## 2021-11-02 DIAGNOSIS — T84.032A MECHANICAL LOOSENING OF INTERNAL RIGHT KNEE PROSTHETIC JOINT, INITIAL ENCOUNTER (HCC): ICD-10-CM

## 2021-11-02 LAB
ABO/RH: NORMAL
ANTIBODY SCREEN: NORMAL
APPEARANCE FLUID: NORMAL
CELL COUNT FLUID TYPE: NORMAL
CLOT EVALUATION: NORMAL
COLOR FLUID: YELLOW
LYMPHOCYTES, BODY FLUID: 31 %
MONOCYTE, FLUID: 15 %
NEUTROPHIL, FLUID: 54 %
NUCLEATED CELLS FLUID: 1927 /CUMM
NUMBER OF CELLS COUNTED FLUID: 100
RBC FLUID: 5700 /CUMM

## 2021-11-02 PROCEDURE — 2709999900 HC NON-CHARGEABLE SUPPLY: Performed by: ORTHOPAEDIC SURGERY

## 2021-11-02 PROCEDURE — 89051 BODY FLUID CELL COUNT: CPT

## 2021-11-02 PROCEDURE — 6360000002 HC RX W HCPCS: Performed by: NURSE ANESTHETIST, CERTIFIED REGISTERED

## 2021-11-02 PROCEDURE — 3700000001 HC ADD 15 MINUTES (ANESTHESIA): Performed by: ORTHOPAEDIC SURGERY

## 2021-11-02 PROCEDURE — 3600000005 HC SURGERY LEVEL 5 BASE: Performed by: ORTHOPAEDIC SURGERY

## 2021-11-02 PROCEDURE — 3700000000 HC ANESTHESIA ATTENDED CARE: Performed by: ORTHOPAEDIC SURGERY

## 2021-11-02 PROCEDURE — 6370000000 HC RX 637 (ALT 250 FOR IP): Performed by: ORTHOPAEDIC SURGERY

## 2021-11-02 PROCEDURE — 2500000003 HC RX 250 WO HCPCS: Performed by: ORTHOPAEDIC SURGERY

## 2021-11-02 PROCEDURE — 7100000001 HC PACU RECOVERY - ADDTL 15 MIN: Performed by: ORTHOPAEDIC SURGERY

## 2021-11-02 PROCEDURE — 2720000010 HC SURG SUPPLY STERILE: Performed by: ORTHOPAEDIC SURGERY

## 2021-11-02 PROCEDURE — 2580000003 HC RX 258: Performed by: ORTHOPAEDIC SURGERY

## 2021-11-02 PROCEDURE — 3E0T3BZ INTRODUCTION OF ANESTHETIC AGENT INTO PERIPHERAL NERVES AND PLEXI, PERCUTANEOUS APPROACH: ICD-10-PCS | Performed by: ANESTHESIOLOGY

## 2021-11-02 PROCEDURE — 6360000002 HC RX W HCPCS: Performed by: ORTHOPAEDIC SURGERY

## 2021-11-02 PROCEDURE — 94150 VITAL CAPACITY TEST: CPT

## 2021-11-02 PROCEDURE — 87077 CULTURE AEROBIC IDENTIFY: CPT

## 2021-11-02 PROCEDURE — 2500000003 HC RX 250 WO HCPCS: Performed by: NURSE ANESTHETIST, CERTIFIED REGISTERED

## 2021-11-02 PROCEDURE — 2580000003 HC RX 258: Performed by: ANESTHESIOLOGY

## 2021-11-02 PROCEDURE — C1776 JOINT DEVICE (IMPLANTABLE): HCPCS | Performed by: ORTHOPAEDIC SURGERY

## 2021-11-02 PROCEDURE — 87070 CULTURE OTHR SPECIMN AEROBIC: CPT

## 2021-11-02 PROCEDURE — 73560 X-RAY EXAM OF KNEE 1 OR 2: CPT

## 2021-11-02 PROCEDURE — 86900 BLOOD TYPING SEROLOGIC ABO: CPT

## 2021-11-02 PROCEDURE — 7100000000 HC PACU RECOVERY - FIRST 15 MIN: Performed by: ORTHOPAEDIC SURGERY

## 2021-11-02 PROCEDURE — 1200000000 HC SEMI PRIVATE

## 2021-11-02 PROCEDURE — 87075 CULTR BACTERIA EXCEPT BLOOD: CPT

## 2021-11-02 PROCEDURE — 87205 SMEAR GRAM STAIN: CPT

## 2021-11-02 PROCEDURE — 62327 NJX INTERLAMINAR LMBR/SAC: CPT | Performed by: ANESTHESIOLOGY

## 2021-11-02 PROCEDURE — 0SBC0ZZ EXCISION OF RIGHT KNEE JOINT, OPEN APPROACH: ICD-10-PCS | Performed by: ORTHOPAEDIC SURGERY

## 2021-11-02 PROCEDURE — 6360000002 HC RX W HCPCS: Performed by: ANESTHESIOLOGY

## 2021-11-02 PROCEDURE — 64447 NJX AA&/STRD FEMORAL NRV IMG: CPT | Performed by: ANESTHESIOLOGY

## 2021-11-02 PROCEDURE — C1713 ANCHOR/SCREW BN/BN,TIS/BN: HCPCS | Performed by: ORTHOPAEDIC SURGERY

## 2021-11-02 PROCEDURE — 0SRC0J9 REPLACEMENT OF RIGHT KNEE JOINT WITH SYNTHETIC SUBSTITUTE, CEMENTED, OPEN APPROACH: ICD-10-PCS | Performed by: ORTHOPAEDIC SURGERY

## 2021-11-02 PROCEDURE — 86901 BLOOD TYPING SEROLOGIC RH(D): CPT

## 2021-11-02 PROCEDURE — 86850 RBC ANTIBODY SCREEN: CPT

## 2021-11-02 PROCEDURE — 3600000015 HC SURGERY LEVEL 5 ADDTL 15MIN: Performed by: ORTHOPAEDIC SURGERY

## 2021-11-02 PROCEDURE — 0SPC0JZ REMOVAL OF SYNTHETIC SUBSTITUTE FROM RIGHT KNEE JOINT, OPEN APPROACH: ICD-10-PCS | Performed by: ORTHOPAEDIC SURGERY

## 2021-11-02 PROCEDURE — 2580000003 HC RX 258: Performed by: NURSE ANESTHETIST, CERTIFIED REGISTERED

## 2021-11-02 DEVICE — SLEEVE TIB H40MM AP27MM ML45MM MTPHSEAL TI PORCOAT POR REV: Type: IMPLANTABLE DEVICE | Site: KNEE | Status: FUNCTIONAL

## 2021-11-02 DEVICE — SLEEVE FEM L34MM UNIV MTPHSEAL FULL POR LPS: Type: IMPLANTABLE DEVICE | Site: KNEE | Status: FUNCTIONAL

## 2021-11-02 DEVICE — IMPLANTABLE DEVICE: Type: IMPLANTABLE DEVICE | Site: KNEE | Status: FUNCTIONAL

## 2021-11-02 DEVICE — STEM FEM L75MM DIA14MM UNIV KNEE FLUT PRESSFIT FOR ROT HNG: Type: IMPLANTABLE DEVICE | Site: KNEE | Status: FUNCTIONAL

## 2021-11-02 DEVICE — ADAPTER FEM 5DEG KNEE PFC SIG: Type: IMPLANTABLE DEVICE | Site: KNEE | Status: FUNCTIONAL

## 2021-11-02 DEVICE — TRAY TIB SZ 3 AP45.8MM ML69.6MM THK4.8MM CEM KEELED MOB: Type: IMPLANTABLE DEVICE | Site: KNEE | Status: FUNCTIONAL

## 2021-11-02 DEVICE — CEMENT BNE 20ML 41GM FULL DOSE PMMA W/ TOBRA M VISC RADPQ: Type: IMPLANTABLE DEVICE | Site: KNEE | Status: FUNCTIONAL

## 2021-11-02 DEVICE — ADAPTER FEM +2/-2MM OFFSET BOLT PFC SIG: Type: IMPLANTABLE DEVICE | Site: KNEE | Status: FUNCTIONAL

## 2021-11-02 RX ORDER — MIDAZOLAM HYDROCHLORIDE 1 MG/ML
INJECTION INTRAMUSCULAR; INTRAVENOUS PRN
Status: DISCONTINUED | OUTPATIENT
Start: 2021-11-02 | End: 2021-11-02 | Stop reason: SDUPTHER

## 2021-11-02 RX ORDER — OXYCODONE HYDROCHLORIDE 5 MG/1
5 TABLET ORAL EVERY 6 HOURS PRN
Qty: 28 TABLET | Refills: 0 | Status: SHIPPED | OUTPATIENT
Start: 2021-11-02 | End: 2021-11-09

## 2021-11-02 RX ORDER — LEVOTHYROXINE SODIUM 0.07 MG/1
75 TABLET ORAL DAILY
Status: DISCONTINUED | OUTPATIENT
Start: 2021-11-03 | End: 2021-11-03 | Stop reason: HOSPADM

## 2021-11-02 RX ORDER — DIPHENHYDRAMINE HYDROCHLORIDE 50 MG/ML
12.5 INJECTION INTRAMUSCULAR; INTRAVENOUS
Status: DISCONTINUED | OUTPATIENT
Start: 2021-11-02 | End: 2021-11-02 | Stop reason: HOSPADM

## 2021-11-02 RX ORDER — BUPIVACAINE HYDROCHLORIDE 5 MG/ML
INJECTION, SOLUTION EPIDURAL; INTRACAUDAL
Status: COMPLETED | OUTPATIENT
Start: 2021-11-02 | End: 2021-11-02

## 2021-11-02 RX ORDER — OXYCODONE HYDROCHLORIDE 5 MG/1
5 TABLET ORAL EVERY 4 HOURS PRN
Status: DISCONTINUED | OUTPATIENT
Start: 2021-11-02 | End: 2021-11-03 | Stop reason: HOSPADM

## 2021-11-02 RX ORDER — LIDOCAINE HYDROCHLORIDE 10 MG/ML
5 INJECTION, SOLUTION EPIDURAL; INFILTRATION; INTRACAUDAL; PERINEURAL ONCE
Status: DISCONTINUED | OUTPATIENT
Start: 2021-11-02 | End: 2021-11-02 | Stop reason: HOSPADM

## 2021-11-02 RX ORDER — ONDANSETRON 2 MG/ML
4 INJECTION INTRAMUSCULAR; INTRAVENOUS
Status: DISCONTINUED | OUTPATIENT
Start: 2021-11-02 | End: 2021-11-02 | Stop reason: HOSPADM

## 2021-11-02 RX ORDER — PROMETHAZINE HYDROCHLORIDE 25 MG/ML
6.25 INJECTION, SOLUTION INTRAMUSCULAR; INTRAVENOUS
Status: DISCONTINUED | OUTPATIENT
Start: 2021-11-02 | End: 2021-11-02 | Stop reason: HOSPADM

## 2021-11-02 RX ORDER — POLYETHYLENE GLYCOL 3350 17 G/17G
17 POWDER, FOR SOLUTION ORAL DAILY PRN
Status: DISCONTINUED | OUTPATIENT
Start: 2021-11-02 | End: 2021-11-03 | Stop reason: HOSPADM

## 2021-11-02 RX ORDER — OXYCODONE HCL 10 MG/1
10 TABLET, FILM COATED, EXTENDED RELEASE ORAL ONCE
Status: COMPLETED | OUTPATIENT
Start: 2021-11-02 | End: 2021-11-02

## 2021-11-02 RX ORDER — DEXAMETHASONE SODIUM PHOSPHATE 10 MG/ML
10 INJECTION, SOLUTION INTRAMUSCULAR; INTRAVENOUS ONCE
Status: COMPLETED | OUTPATIENT
Start: 2021-11-02 | End: 2021-11-02

## 2021-11-02 RX ORDER — METHOCARBAMOL 500 MG/1
500 TABLET, FILM COATED ORAL 4 TIMES DAILY
Status: DISCONTINUED | OUTPATIENT
Start: 2021-11-02 | End: 2021-11-03 | Stop reason: HOSPADM

## 2021-11-02 RX ORDER — FENTANYL CITRATE 50 UG/ML
INJECTION, SOLUTION INTRAMUSCULAR; INTRAVENOUS
Status: COMPLETED
Start: 2021-11-02 | End: 2021-11-02

## 2021-11-02 RX ORDER — LABETALOL HYDROCHLORIDE 5 MG/ML
5 INJECTION, SOLUTION INTRAVENOUS EVERY 10 MIN PRN
Status: DISCONTINUED | OUTPATIENT
Start: 2021-11-02 | End: 2021-11-02 | Stop reason: HOSPADM

## 2021-11-02 RX ORDER — ONDANSETRON 2 MG/ML
INJECTION INTRAMUSCULAR; INTRAVENOUS PRN
Status: DISCONTINUED | OUTPATIENT
Start: 2021-11-02 | End: 2021-11-02 | Stop reason: SDUPTHER

## 2021-11-02 RX ORDER — AMLODIPINE BESYLATE 10 MG/1
10 TABLET ORAL DAILY
Status: DISCONTINUED | OUTPATIENT
Start: 2021-11-03 | End: 2021-11-03 | Stop reason: HOSPADM

## 2021-11-02 RX ORDER — ONDANSETRON 2 MG/ML
4 INJECTION INTRAMUSCULAR; INTRAVENOUS EVERY 6 HOURS PRN
Status: DISCONTINUED | OUTPATIENT
Start: 2021-11-02 | End: 2021-11-03 | Stop reason: HOSPADM

## 2021-11-02 RX ORDER — SODIUM CHLORIDE 0.9 % (FLUSH) 0.9 %
5-40 SYRINGE (ML) INJECTION PRN
Status: DISCONTINUED | OUTPATIENT
Start: 2021-11-02 | End: 2021-11-03 | Stop reason: HOSPADM

## 2021-11-02 RX ORDER — BUPIVACAINE HYDROCHLORIDE 5 MG/ML
INJECTION, SOLUTION EPIDURAL; INTRACAUDAL PRN
Status: DISCONTINUED | OUTPATIENT
Start: 2021-11-02 | End: 2021-11-02 | Stop reason: SDUPTHER

## 2021-11-02 RX ORDER — DEXAMETHASONE SODIUM PHOSPHATE 4 MG/ML
INJECTION, SOLUTION INTRA-ARTICULAR; INTRALESIONAL; INTRAMUSCULAR; INTRAVENOUS; SOFT TISSUE
Status: DISPENSED
Start: 2021-11-02 | End: 2021-11-02

## 2021-11-02 RX ORDER — PROPOFOL 10 MG/ML
INJECTION, EMULSION INTRAVENOUS CONTINUOUS PRN
Status: DISCONTINUED | OUTPATIENT
Start: 2021-11-02 | End: 2021-11-02 | Stop reason: SDUPTHER

## 2021-11-02 RX ORDER — SODIUM CHLORIDE 9 MG/ML
25 INJECTION, SOLUTION INTRAVENOUS PRN
Status: DISCONTINUED | OUTPATIENT
Start: 2021-11-02 | End: 2021-11-03 | Stop reason: HOSPADM

## 2021-11-02 RX ORDER — ACETAMINOPHEN 500 MG
1000 TABLET ORAL ONCE
Status: COMPLETED | OUTPATIENT
Start: 2021-11-02 | End: 2021-11-02

## 2021-11-02 RX ORDER — AMLODIPINE BESYLATE 10 MG/1
10 TABLET ORAL DAILY
Status: DISCONTINUED | OUTPATIENT
Start: 2021-11-02 | End: 2021-11-02

## 2021-11-02 RX ORDER — SODIUM CHLORIDE, SODIUM LACTATE, POTASSIUM CHLORIDE, CALCIUM CHLORIDE 600; 310; 30; 20 MG/100ML; MG/100ML; MG/100ML; MG/100ML
INJECTION, SOLUTION INTRAVENOUS CONTINUOUS
Status: DISCONTINUED | OUTPATIENT
Start: 2021-11-02 | End: 2021-11-02

## 2021-11-02 RX ORDER — ACETAMINOPHEN 500 MG
1000 TABLET ORAL EVERY 8 HOURS SCHEDULED
Status: DISCONTINUED | OUTPATIENT
Start: 2021-11-02 | End: 2021-11-03 | Stop reason: HOSPADM

## 2021-11-02 RX ORDER — BUPIVACAINE HYDROCHLORIDE 2.5 MG/ML
INJECTION, SOLUTION EPIDURAL; INFILTRATION; INTRACAUDAL PRN
Status: DISCONTINUED | OUTPATIENT
Start: 2021-11-02 | End: 2021-11-02 | Stop reason: ALTCHOICE

## 2021-11-02 RX ORDER — SENNA AND DOCUSATE SODIUM 50; 8.6 MG/1; MG/1
1 TABLET, FILM COATED ORAL 2 TIMES DAILY
Status: DISCONTINUED | OUTPATIENT
Start: 2021-11-02 | End: 2021-11-03 | Stop reason: HOSPADM

## 2021-11-02 RX ORDER — SODIUM CHLORIDE 0.9 % (FLUSH) 0.9 %
5-40 SYRINGE (ML) INJECTION EVERY 12 HOURS SCHEDULED
Status: DISCONTINUED | OUTPATIENT
Start: 2021-11-02 | End: 2021-11-03 | Stop reason: HOSPADM

## 2021-11-02 RX ORDER — VANCOMYCIN HYDROCHLORIDE 500 MG/10ML
INJECTION, POWDER, LYOPHILIZED, FOR SOLUTION INTRAVENOUS PRN
Status: DISCONTINUED | OUTPATIENT
Start: 2021-11-02 | End: 2021-11-02 | Stop reason: ALTCHOICE

## 2021-11-02 RX ORDER — FENTANYL CITRATE 50 UG/ML
50 INJECTION, SOLUTION INTRAMUSCULAR; INTRAVENOUS EVERY 5 MIN PRN
Status: DISCONTINUED | OUTPATIENT
Start: 2021-11-02 | End: 2021-11-02 | Stop reason: HOSPADM

## 2021-11-02 RX ORDER — ONDANSETRON 4 MG/1
4 TABLET, ORALLY DISINTEGRATING ORAL EVERY 8 HOURS PRN
Status: DISCONTINUED | OUTPATIENT
Start: 2021-11-02 | End: 2021-11-03 | Stop reason: HOSPADM

## 2021-11-02 RX ORDER — SODIUM CHLORIDE 9 MG/ML
INJECTION, SOLUTION INTRAVENOUS CONTINUOUS PRN
Status: DISCONTINUED | OUTPATIENT
Start: 2021-11-02 | End: 2021-11-02 | Stop reason: SDUPTHER

## 2021-11-02 RX ORDER — FENTANYL CITRATE 50 UG/ML
INJECTION, SOLUTION INTRAMUSCULAR; INTRAVENOUS PRN
Status: DISCONTINUED | OUTPATIENT
Start: 2021-11-02 | End: 2021-11-02 | Stop reason: SDUPTHER

## 2021-11-02 RX ORDER — BUPIVACAINE HYDROCHLORIDE 5 MG/ML
INJECTION, SOLUTION EPIDURAL; INTRACAUDAL
Status: COMPLETED
Start: 2021-11-02 | End: 2021-11-02

## 2021-11-02 RX ORDER — DEXAMETHASONE SODIUM PHOSPHATE 4 MG/ML
INJECTION, SOLUTION INTRA-ARTICULAR; INTRALESIONAL; INTRAMUSCULAR; INTRAVENOUS; SOFT TISSUE PRN
Status: DISCONTINUED | OUTPATIENT
Start: 2021-11-02 | End: 2021-11-02 | Stop reason: SDUPTHER

## 2021-11-02 RX ORDER — HYDROXYZINE HYDROCHLORIDE 10 MG/1
10 TABLET, FILM COATED ORAL EVERY 8 HOURS PRN
Status: DISCONTINUED | OUTPATIENT
Start: 2021-11-02 | End: 2021-11-03 | Stop reason: HOSPADM

## 2021-11-02 RX ORDER — SODIUM CHLORIDE 9 MG/ML
INJECTION, SOLUTION INTRAVENOUS CONTINUOUS
Status: DISCONTINUED | OUTPATIENT
Start: 2021-11-02 | End: 2021-11-03 | Stop reason: HOSPADM

## 2021-11-02 RX ORDER — HYDRALAZINE HYDROCHLORIDE 20 MG/ML
5 INJECTION INTRAMUSCULAR; INTRAVENOUS EVERY 10 MIN PRN
Status: DISCONTINUED | OUTPATIENT
Start: 2021-11-02 | End: 2021-11-02 | Stop reason: HOSPADM

## 2021-11-02 RX ORDER — OXYCODONE HYDROCHLORIDE 5 MG/1
10 TABLET ORAL EVERY 4 HOURS PRN
Status: DISCONTINUED | OUTPATIENT
Start: 2021-11-02 | End: 2021-11-03 | Stop reason: HOSPADM

## 2021-11-02 RX ADMIN — DEXAMETHASONE SODIUM PHOSPHATE 8 MG: 4 INJECTION, SOLUTION INTRAMUSCULAR; INTRAVENOUS at 13:18

## 2021-11-02 RX ADMIN — VANCOMYCIN HYDROCHLORIDE 1500 MG: 1 INJECTION, POWDER, LYOPHILIZED, FOR SOLUTION INTRAVENOUS at 12:33

## 2021-11-02 RX ADMIN — ACETAMINOPHEN 1000 MG: 500 TABLET, FILM COATED ORAL at 11:11

## 2021-11-02 RX ADMIN — MIDAZOLAM HYDROCHLORIDE 4 MG: 2 INJECTION, SOLUTION INTRAMUSCULAR; INTRAVENOUS at 13:15

## 2021-11-02 RX ADMIN — DOCUSATE SODIUM 50 MG AND SENNOSIDES 8.6 MG 1 TABLET: 8.6; 5 TABLET, FILM COATED ORAL at 20:25

## 2021-11-02 RX ADMIN — FENTANYL CITRATE 100 MCG: 50 INJECTION, SOLUTION INTRAMUSCULAR; INTRAVENOUS at 12:10

## 2021-11-02 RX ADMIN — BUPIVACAINE HYDROCHLORIDE 10 ML: 5 INJECTION, SOLUTION EPIDURAL; INTRACAUDAL at 13:07

## 2021-11-02 RX ADMIN — BUPIVACAINE HYDROCHLORIDE 10 ML: 5 INJECTION, SOLUTION EPIDURAL; INTRACAUDAL at 15:45

## 2021-11-02 RX ADMIN — OXYCODONE 10 MG: 5 TABLET ORAL at 20:32

## 2021-11-02 RX ADMIN — TRANEXAMIC ACID 1000 MG: 1 INJECTION, SOLUTION INTRAVENOUS at 13:13

## 2021-11-02 RX ADMIN — SODIUM CHLORIDE, PRESERVATIVE FREE 10 ML: 5 INJECTION INTRAVENOUS at 20:26

## 2021-11-02 RX ADMIN — DEXAMETHASONE SODIUM PHOSPHATE 10 MG: 10 INJECTION INTRAMUSCULAR; INTRAVENOUS at 11:11

## 2021-11-02 RX ADMIN — ACETAMINOPHEN 1000 MG: 500 TABLET, FILM COATED ORAL at 22:12

## 2021-11-02 RX ADMIN — CEFAZOLIN 2000 MG: 10 INJECTION, POWDER, FOR SOLUTION INTRAVENOUS at 21:23

## 2021-11-02 RX ADMIN — FAMOTIDINE 20 MG: 10 INJECTION, SOLUTION INTRAVENOUS at 13:13

## 2021-11-02 RX ADMIN — BUPIVACAINE HYDROCHLORIDE 25 ML: 5 INJECTION, SOLUTION EPIDURAL; INTRACAUDAL at 12:50

## 2021-11-02 RX ADMIN — SODIUM CHLORIDE: 9 INJECTION, SOLUTION INTRAVENOUS at 13:46

## 2021-11-02 RX ADMIN — CEFAZOLIN 2000 MG: 10 INJECTION, POWDER, FOR SOLUTION INTRAVENOUS at 13:15

## 2021-11-02 RX ADMIN — SODIUM CHLORIDE: 9 INJECTION, SOLUTION INTRAVENOUS at 17:38

## 2021-11-02 RX ADMIN — BUPIVACAINE HYDROCHLORIDE 10 ML: 5 INJECTION, SOLUTION EPIDURAL; INTRACAUDAL at 14:33

## 2021-11-02 RX ADMIN — METHOCARBAMOL 500 MG: 500 TABLET ORAL at 20:25

## 2021-11-02 RX ADMIN — OXYCODONE HYDROCHLORIDE 10 MG: 10 TABLET, FILM COATED, EXTENDED RELEASE ORAL at 11:11

## 2021-11-02 RX ADMIN — BUPIVACAINE HYDROCHLORIDE 10 ML: 5 INJECTION, SOLUTION EPIDURAL; INTRACAUDAL; PERINEURAL at 13:08

## 2021-11-02 RX ADMIN — ONDANSETRON 4 MG: 2 INJECTION INTRAMUSCULAR; INTRAVENOUS at 13:15

## 2021-11-02 RX ADMIN — PROPOFOL 50 MCG/KG/MIN: 10 INJECTION, EMULSION INTRAVENOUS at 13:15

## 2021-11-02 RX ADMIN — SODIUM CHLORIDE, POTASSIUM CHLORIDE, SODIUM LACTATE AND CALCIUM CHLORIDE: 600; 310; 30; 20 INJECTION, SOLUTION INTRAVENOUS at 11:04

## 2021-11-02 ASSESSMENT — PULMONARY FUNCTION TESTS
PIF_VALUE: 0
PIF_VALUE: 1
PIF_VALUE: 0
PIF_VALUE: 0
PIF_VALUE: 1
PIF_VALUE: 0
PIF_VALUE: 0
PIF_VALUE: 1
PIF_VALUE: 0
PIF_VALUE: 0
PIF_VALUE: 1
PIF_VALUE: 1
PIF_VALUE: 0
PIF_VALUE: 1
PIF_VALUE: 0
PIF_VALUE: 1
PIF_VALUE: 0
PIF_VALUE: 0
PIF_VALUE: 1
PIF_VALUE: 0
PIF_VALUE: 1
PIF_VALUE: 0
PIF_VALUE: 1
PIF_VALUE: 0
PIF_VALUE: 1
PIF_VALUE: 0
PIF_VALUE: 1
PIF_VALUE: 0
PIF_VALUE: 1
PIF_VALUE: 0
PIF_VALUE: 1
PIF_VALUE: 1
PIF_VALUE: 0
PIF_VALUE: 1
PIF_VALUE: 0
PIF_VALUE: 1
PIF_VALUE: 0
PIF_VALUE: 1
PIF_VALUE: 1
PIF_VALUE: 0
PIF_VALUE: 1
PIF_VALUE: 0
PIF_VALUE: 1
PIF_VALUE: 0
PIF_VALUE: 1
PIF_VALUE: 1
PIF_VALUE: 0
PIF_VALUE: 1
PIF_VALUE: 0
PIF_VALUE: 1
PIF_VALUE: 0
PIF_VALUE: 1
PIF_VALUE: 0
PIF_VALUE: 0
PIF_VALUE: 1
PIF_VALUE: 0
PIF_VALUE: 1
PIF_VALUE: 1
PIF_VALUE: 0
PIF_VALUE: 0
PIF_VALUE: 1
PIF_VALUE: 0
PIF_VALUE: 1
PIF_VALUE: 0
PIF_VALUE: 1
PIF_VALUE: 0
PIF_VALUE: 0
PIF_VALUE: 1
PIF_VALUE: 1
PIF_VALUE: 0
PIF_VALUE: 1
PIF_VALUE: 0
PIF_VALUE: 1
PIF_VALUE: 1
PIF_VALUE: 0
PIF_VALUE: 0
PIF_VALUE: 1
PIF_VALUE: 0
PIF_VALUE: 1
PIF_VALUE: 0
PIF_VALUE: 0
PIF_VALUE: 1
PIF_VALUE: 1
PIF_VALUE: 0
PIF_VALUE: 1
PIF_VALUE: 0
PIF_VALUE: 1
PIF_VALUE: 0
PIF_VALUE: 1
PIF_VALUE: 0
PIF_VALUE: 1
PIF_VALUE: 0
PIF_VALUE: 1
PIF_VALUE: 0
PIF_VALUE: 1
PIF_VALUE: 0
PIF_VALUE: 1
PIF_VALUE: 0
PIF_VALUE: 1
PIF_VALUE: 0
PIF_VALUE: 1
PIF_VALUE: 1
PIF_VALUE: 0
PIF_VALUE: 1
PIF_VALUE: 0
PIF_VALUE: 0
PIF_VALUE: 1
PIF_VALUE: 1
PIF_VALUE: 0
PIF_VALUE: 1
PIF_VALUE: 0
PIF_VALUE: 1
PIF_VALUE: 0
PIF_VALUE: 1
PIF_VALUE: 0
PIF_VALUE: 1
PIF_VALUE: 0
PIF_VALUE: 1
PIF_VALUE: 0
PIF_VALUE: 1
PIF_VALUE: 0
PIF_VALUE: 1
PIF_VALUE: 0
PIF_VALUE: 1
PIF_VALUE: 0
PIF_VALUE: 1
PIF_VALUE: 0
PIF_VALUE: 0
PIF_VALUE: 1
PIF_VALUE: 0
PIF_VALUE: 1
PIF_VALUE: 0
PIF_VALUE: 1
PIF_VALUE: 0
PIF_VALUE: 1
PIF_VALUE: 0
PIF_VALUE: 0
PIF_VALUE: 1
PIF_VALUE: 0
PIF_VALUE: 0
PIF_VALUE: 1

## 2021-11-02 ASSESSMENT — PAIN SCALES - GENERAL
PAINLEVEL_OUTOF10: 0
PAINLEVEL_OUTOF10: 0
PAINLEVEL_OUTOF10: 8
PAINLEVEL_OUTOF10: 6
PAINLEVEL_OUTOF10: 0

## 2021-11-02 ASSESSMENT — PAIN DESCRIPTION - ONSET
ONSET: GRADUAL
ONSET: GRADUAL

## 2021-11-02 ASSESSMENT — PAIN DESCRIPTION - FREQUENCY
FREQUENCY: CONTINUOUS
FREQUENCY: CONTINUOUS

## 2021-11-02 ASSESSMENT — PAIN DESCRIPTION - DESCRIPTORS
DESCRIPTORS: ACHING
DESCRIPTORS: ACHING

## 2021-11-02 ASSESSMENT — PAIN - FUNCTIONAL ASSESSMENT
PAIN_FUNCTIONAL_ASSESSMENT: PREVENTS OR INTERFERES SOME ACTIVE ACTIVITIES AND ADLS
PAIN_FUNCTIONAL_ASSESSMENT: FACES
PAIN_FUNCTIONAL_ASSESSMENT: 0-10
PAIN_FUNCTIONAL_ASSESSMENT: 0-10
PAIN_FUNCTIONAL_ASSESSMENT: FACES
PAIN_FUNCTIONAL_ASSESSMENT: FACES
PAIN_FUNCTIONAL_ASSESSMENT: 0-10
PAIN_FUNCTIONAL_ASSESSMENT: PREVENTS OR INTERFERES SOME ACTIVE ACTIVITIES AND ADLS

## 2021-11-02 ASSESSMENT — PAIN DESCRIPTION - PROGRESSION
CLINICAL_PROGRESSION: NOT CHANGED
CLINICAL_PROGRESSION: GRADUALLY WORSENING

## 2021-11-02 ASSESSMENT — PAIN DESCRIPTION - LOCATION
LOCATION: ANKLE
LOCATION: ANKLE

## 2021-11-02 ASSESSMENT — PAIN DESCRIPTION - ORIENTATION
ORIENTATION: RIGHT
ORIENTATION: RIGHT

## 2021-11-02 ASSESSMENT — PAIN DESCRIPTION - PAIN TYPE
TYPE: SURGICAL PAIN
TYPE: SURGICAL PAIN

## 2021-11-02 NOTE — PROGRESS NOTES
PACU Transfer Note    Vitals:    11/02/21 1919   BP: 125/79   Pulse: 77   Resp: 15   Temp: 96.9 °F (36.1 °C)   SpO2: 100%       In: 1588.9 [P.O.:480; I.V.:1108.9]  Out: 100     Pain assessment:  none  Pain Level: 0    Report given to Receiving unit RN with personal belongings including clothing and crutches. Pt wife, Tanja Atwood called and made aware pt transferring to room.      11/2/2021 7:30 PM

## 2021-11-02 NOTE — FLOWSHEET NOTE
Pt wife updated in [place] personally. Writer was given pt glasses by wife. Pt now has them on. Will continue to monitor.

## 2021-11-02 NOTE — H&P
Date of Surgery Update:  Maycol Luong was seen, history and physical examination reviewed, and patient examined by me today. There have been no significant clinical changes since the completion of the previous history and physical.    The risk, benefits, and alternatives of the proposed procedure have been explained to the patient (or appropriate guardian) and understanding verbalized. All questions answered. Patient wishes to proceed.     Electronically signed by: Brii Yousif MD,11/2/2021,12:31 PM

## 2021-11-02 NOTE — OP NOTE
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 POSTOPERATIVE DIAGNOSES:    1. 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 2.  Arthrofibrosis of right total knee arthroplasty    OPERATION PERFORMED:    1. Revision right total knee arthroplasty, femoral  and tibial components. 2.  Extensive Synovectomy of right knee     ATTENDING SURGEON:  Reina Rudolph M.D. FIRST SURGICAL ASSISTANT:  Lori Ma, [facility]. The Physician Assistant was necessary to perform the surgery today by assisting with application of implants and manipulation of the extremity. This help was not otherwise available in the operating room today. ANESTHESIA:  Epidural plus ortho cocktail mixture. ESTIMATED BLOOD LOSS:  100 mL. INTRAVENOUS FLUID:  2000 cc of crystalloid. TOURNIQUET TIME:  129 minutes at 320 mmHg. IMPLANTS:  1.  DePuy sigma TC3, 3 right femur with +2 bolt with 5-degree  adaptor and 34 mm femoral sleeve and 75 x 14 mm fluted stem with  8 mm distal augment  laterally with 4 mm distal augment medially. 2.  DePuy 3 M.B.T. revision tray rotating platform with 45 mm  metaphyseal sleeve and 75 x 14 mm fluted stem. 3.  DePuy size 3 x 15 mm thickness TC3 rotating platform insert. SPECIMENS:  Synovial fluid sent for cell count differential, three  samples sent for routine cultures. COMPLICATIONS:  None. INDICATIONS: This patient is status post  total knee arthroplasty by an outside surgeon 17 years ago. They developed pain and dysfunction. They eventually presented to my  clinic where they had  loosening on  radiographic examination. Based on this, I offered the patient a revision of the loose total knee arthroplasty.   We discussed the risks and benefits of the  operation in full including but not limited to pain, scar, infection,  need for repeat surgery, fracture, stiffness, and dissatisfaction  associated with revision arthroplasty, need for further surgery or  even loss of life or limb. Despite these risks and others, they  elected to proceed. OPERATIVE DETAILS:  The patient was greeted in the preoperative  holding area. The operative knee was marked with a marker. They were  brought to the operating room where general anesthesia was obtained. They were placed in the supine position with a tourniquet on the thigh  and a bump under the operative hip and all bony prominences well padded. Pre-operative antibiotics were administered prior to surgical incision and tourniquet  inflation. The operative extremity was prepped and draped in a normal  standard sterile surgical fashion followed by final time-out verifying  correct patient, operative site, and operative plan. The operative lower  extremity was exsanguinated with an Ace bandage and tourniquet was  inflated. I utilized the previous anterior-based incision extending it 1-2 cm  proximally and distally to expose the extensor mechanism. Full-thickness layers were elevated medially and laterally. I  aspirated synovial fluid, which was sent for stat cell  count and differential which later returned negative for signs of  infection. I then performed a medial parapatellar arthrotomy with a  medial release and performed an extensive synovectomy while sending samples for culture. At this point I noted a severe arthrofibrotic tissue appearance and under anesthesia had limited motion. I spent 45 minutes debriding arthrofibrotic tissue performing a complete synovectomy of an arthrofibrotic knee to improve motion, function and surgical exposure. I then obtained exposure of the  femur and removed the femur with an offset osteotome. I then used an offset osteotome to elevate the tibial  Component. After I had removed all the femoral and  tibial cement, I then reamed to the above size stems for the femur and  the tibia. I then broached for a sleeve in the tibia followed by tibia  trial in appropriate external rotation. I then prepared the femur with  augments, and I did broach for a metaphyseal sleeve. I assembled the trial and placed it onto the femur. I placed the knee into extension and took through wide range of  motion. I distalized the femur well and restored the joint  line and took an intraoperative x-ray, which verified a correct  position of my implant. I was pleased with this. I opened these  implants on the back table. I removed the trials and copiously  irrigated the wound with multiple Betadine solution soaks. I then  dried all the bony and mixed cement on the back table and cemented the  tibia and femur into place removing all extra cement. This knee was  placed into full extension for cement curing. I trialed multiple  polyethylene liners and selected the 15 mm, placed it in the knee and once  again took the knee through a wide range of motion. The patella  tracked centrally. I did perform a lateral release with a small  resection of the lateral patellar facet. I was pleased with my  construct. I once again irrigated the wound and then closed the knee  in mid flexion with interrupted 0 Vicryl and arthrotomy oversewn with  #2 Stratafix and tourniquet was deflated. There was no profuse  bleeding. I injected the ortho cocktail mixture into the deep and  subcutaneous tissues followed by 0 Vicryl in the subcutaneous tissue,  2-0 Vicryl in a buried interrupted fashion, the subdermal tissue and  4-0 monocryl for definitive closure. A sterile silver-impregnated  dressing was applied followed by an Ace bandage. The patient was then  extubated and transported to the postoperative anesthesia care unit in  stable condition. All sponge and needle counts were correct x2. The  patient tolerated the procedure well without complication.     The overall operative time of this operation was more the double of standard revision total knee arthroplasty (95533) secondary to the severe arthrofibrotic contracture.

## 2021-11-02 NOTE — ANESTHESIA PRE PROCEDURE
Department of Anesthesiology  Preprocedure Note       Name:  Hamilton Worthy   Age:  74 y.o.  :  1955                                          MRN:  [id number]         Date:  2021      Surgeon: Elia Fuentes):  Saundra Jolley MD    Procedure: Procedure(s):  REVISION RIGHT TOTAL KNEE ARTHROPLASTY    Medications prior to admission:   Prior to Admission medications    Medication Sig Start Date End Date Taking? Authorizing Provider   apixaban (ELIQUIS) 2.5 MG TABS tablet Take 1 tablet by mouth 2 times daily 21 Yes Saundra Jolley MD   oxyCODONE (ROXICODONE) 5 MG immediate release tablet Take 1 tablet by mouth every 6 hours as needed for Pain for up to 7 days. Intended supply: 7 days.  Take lowest dose possible to manage pain 21 Yes Saundra Jolley MD   amLODIPine (NORVASC) 10 MG tablet Take 1 tablet by mouth daily 21  Yes Dayanara Alvraez MD   levothyroxine (SYNTHROID) 75 MCG tablet Take 1 tablet by mouth Daily 21  Yes Dayanara Alvarez MD       Current medications:    Current Facility-Administered Medications   Medication Dose Route Frequency Provider Last Rate Last Admin    vancomycin (VANCOCIN) 1,500 mg in dextrose 5 % 250 mL IVPB  15 mg/kg IntraVENous Once Saundra Jolley .7 mL/hr at 21 1233 1,500 mg at 21 1233    ceFAZolin (ANCEF) 2000 mg in dextrose 5 % 50 mL IVPB  2,000 mg IntraVENous Once Saundra Jolley MD        lactated ringers infusion   IntraVENous Continuous Saundra Jolley MD        tranexamic acid (CYKLOKAPRON) 1,000 mg in sodium chloride 0.9 % 50 mL IVPB  1,000 mg IntraVENous Once Saundra Jolley MD        lactated ringers infusion   IntraVENous Continuous Kam Calhoun  mL/hr at 21 1104 New Bag at 21 1104    lidocaine PF 1 % injection 5 mL  5 mL IntraDERmal Once Raul Dillon DO        bupivacaine (PF) (MARCAINE) 0.5 % injection             dexamethasone (DECADRON) 4 MG/ML injection             fentaNYL (SUBLIMAZE) 100 MCG/2ML injection                Allergies: Allergies   Allergen Reactions    Lisinopril Swelling    Aspirin Hives and Itching       Problem List:    Patient Active Problem List   Diagnosis Code    Essential hypertension I10    Obesity (BMI 30-39. 9) E66.9    Hypothyroid E03.9    Overweight E66.3    Osteoarthritis of knee M17.10    Prediabetes R73.03    Pure hypertriglyceridemia E78.1    Snoring R06.83    Mechanical loosening of internal right knee prosthetic joint (HCC) T84.032A       Past Medical History:        Diagnosis Date    Allergic drug reaction 05/09/2019    Angioedema 05/10/2019    Arthritis     Benign prostatic hyperplasia without lower urinary tract symptoms     Elevated PSA     Hypertension     Pure hypertriglyceridemia 10/26/2021    Thyroid disease        Past Surgical History:        Procedure Laterality Date    CHOLECYSTECTOMY      ELBOW SURGERY      JOINT REPLACEMENT Right 2005    KNEE SURGERY Left 2019    TKR     PROSTATE BIOPSY         Social History:    Social History     Tobacco Use    Smoking status: Never Smoker    Smokeless tobacco: Never Used   Substance Use Topics    Alcohol use: Never                                Counseling given: Not Answered      Vital Signs (Current):   Vitals:    11/01/21 1337 11/02/21 1034 11/02/21 1213 11/02/21 1230   BP:  (!) 146/91 (!) 154/92 (!) 164/85   Pulse:  71 69 89   Resp:  16 16 19   Temp:  98.1 °F (36.7 °C)     TempSrc:  Oral     SpO2:  98% 100% 99%   Weight: 225 lb (102.1 kg) 225 lb (102.1 kg)     Height: 5' 11\" (1.803 m) 5' 11\" (1.803 m)                                                BP Readings from Last 3 Encounters:   11/02/21 (!) 164/85   10/26/21 (!) 140/88   06/02/21 128/68       NPO Status: Time of last liquid consumption: 0700                        Time of last solid consumption: 1700                        Date of last liquid consumption: 11/02/21                        Date of last solid Beta Blocker         Neuro/Psych:   Negative Neuro/Psych ROS              GI/Hepatic/Renal: Neg GI/Hepatic/Renal ROS            Endo/Other:    (+) hypothyroidism::., .                 Abdominal:             Vascular: negative vascular ROS. Other Findings:             Anesthesia Plan      regional and epidural     ASA 2       Induction: intravenous. MIPS: Postoperative opioids intended. Anesthetic plan and risks discussed with patient. Plan discussed with CRNA.     Attending anesthesiologist reviewed and agrees with Preprocedure content              Dilip Dodson DO   11/2/2021

## 2021-11-02 NOTE — PROGRESS NOTES
Patient admitted to PACU # 03 from OR at 1647 post [facility] - Right   per Dr. Ernestina Fairbanks. Attached to PACU monitoring system and report received from anesthesia provider. Patient was reported to be hemodynamically stable during procedure. Patient drowsy on admission and denied pain. Epidural site c/d/i with band-aid in place. RLE surgical drsg c/d/i. IVF infusing. Pt on 2 L NC. Will continue to monitor.

## 2021-11-02 NOTE — ANESTHESIA PROCEDURE NOTES
Epidural Block    Patient location during procedure: pre-op  Reason for block: post-op pain management  Staffing  Performed: anesthesiologist   Anesthesiologist: Sheryl Holcomb,   Preanesthetic Checklist  Completed: patient identified, IV checked, site marked, risks and benefits discussed, surgical consent, monitors and equipment checked, pre-op evaluation, timeout performed, anesthesia consent given, oxygen available and patient being monitored  Epidural  Patient position: sitting  Prep: ChloraPrep  Patient monitoring: cardiac monitor, continuous pulse ox and frequent blood pressure checks  Approach: midline  Location: lumbar (1-5)  Injection technique: PETER air  Provider prep: mask  Needle  Needle type: Tuohy   Needle gauge: 18 G  Needle length: 6 in  Needle insertion depth: 6 cm  Catheter type: side hole  Catheter at skin depth: 13 cm  Assessment  Sensory level: T10  Hemodynamics: stable  Attempts: 3+

## 2021-11-03 VITALS
TEMPERATURE: 97.5 F | HEART RATE: 82 BPM | OXYGEN SATURATION: 96 % | RESPIRATION RATE: 16 BRPM | DIASTOLIC BLOOD PRESSURE: 81 MMHG | HEIGHT: 71 IN | BODY MASS INDEX: 31.5 KG/M2 | SYSTOLIC BLOOD PRESSURE: 143 MMHG | WEIGHT: 225 LBS

## 2021-11-03 LAB
HCT VFR BLD CALC: 42.8 % (ref 40.5–52.5)
HEMOGLOBIN: 14.7 G/DL (ref 13.5–17.5)

## 2021-11-03 PROCEDURE — 97162 PT EVAL MOD COMPLEX 30 MIN: CPT

## 2021-11-03 PROCEDURE — 6370000000 HC RX 637 (ALT 250 FOR IP): Performed by: ORTHOPAEDIC SURGERY

## 2021-11-03 PROCEDURE — 97166 OT EVAL MOD COMPLEX 45 MIN: CPT

## 2021-11-03 PROCEDURE — 85014 HEMATOCRIT: CPT

## 2021-11-03 PROCEDURE — 36415 COLL VENOUS BLD VENIPUNCTURE: CPT

## 2021-11-03 PROCEDURE — 2580000003 HC RX 258: Performed by: ORTHOPAEDIC SURGERY

## 2021-11-03 PROCEDURE — 6360000002 HC RX W HCPCS: Performed by: ORTHOPAEDIC SURGERY

## 2021-11-03 PROCEDURE — 97530 THERAPEUTIC ACTIVITIES: CPT

## 2021-11-03 PROCEDURE — 97116 GAIT TRAINING THERAPY: CPT

## 2021-11-03 PROCEDURE — 97535 SELF CARE MNGMENT TRAINING: CPT

## 2021-11-03 PROCEDURE — 85018 HEMOGLOBIN: CPT

## 2021-11-03 RX ADMIN — METHOCARBAMOL 500 MG: 500 TABLET ORAL at 08:46

## 2021-11-03 RX ADMIN — LEVOTHYROXINE SODIUM 75 MCG: 0.07 TABLET ORAL at 05:29

## 2021-11-03 RX ADMIN — DOCUSATE SODIUM 50 MG AND SENNOSIDES 8.6 MG 1 TABLET: 8.6; 5 TABLET, FILM COATED ORAL at 08:46

## 2021-11-03 RX ADMIN — METHOCARBAMOL 500 MG: 500 TABLET ORAL at 13:16

## 2021-11-03 RX ADMIN — APIXABAN 2.5 MG: 2.5 TABLET, FILM COATED ORAL at 08:45

## 2021-11-03 RX ADMIN — ACETAMINOPHEN 1000 MG: 500 TABLET, FILM COATED ORAL at 13:16

## 2021-11-03 RX ADMIN — CEFAZOLIN 2000 MG: 10 INJECTION, POWDER, FOR SOLUTION INTRAVENOUS at 05:11

## 2021-11-03 RX ADMIN — AMLODIPINE BESYLATE 10 MG: 10 TABLET ORAL at 08:46

## 2021-11-03 RX ADMIN — ACETAMINOPHEN 1000 MG: 500 TABLET, FILM COATED ORAL at 05:27

## 2021-11-03 RX ADMIN — OXYCODONE 10 MG: 5 TABLET ORAL at 02:33

## 2021-11-03 RX ADMIN — SODIUM CHLORIDE 25 ML: 9 INJECTION, SOLUTION INTRAVENOUS at 05:10

## 2021-11-03 RX ADMIN — OXYCODONE 10 MG: 5 TABLET ORAL at 13:16

## 2021-11-03 ASSESSMENT — PAIN DESCRIPTION - PAIN TYPE: TYPE: SURGICAL PAIN

## 2021-11-03 ASSESSMENT — PAIN SCALES - GENERAL
PAINLEVEL_OUTOF10: 0
PAINLEVEL_OUTOF10: 0
PAINLEVEL_OUTOF10: 3
PAINLEVEL_OUTOF10: 7
PAINLEVEL_OUTOF10: 8

## 2021-11-03 ASSESSMENT — PAIN DESCRIPTION - ORIENTATION: ORIENTATION: RIGHT

## 2021-11-03 ASSESSMENT — PAIN DESCRIPTION - DESCRIPTORS: DESCRIPTORS: ACHING

## 2021-11-03 ASSESSMENT — PAIN - FUNCTIONAL ASSESSMENT: PAIN_FUNCTIONAL_ASSESSMENT: PREVENTS OR INTERFERES SOME ACTIVE ACTIVITIES AND ADLS

## 2021-11-03 ASSESSMENT — PAIN DESCRIPTION - PROGRESSION: CLINICAL_PROGRESSION: NOT CHANGED

## 2021-11-03 ASSESSMENT — PAIN DESCRIPTION - ONSET: ONSET: ON-GOING

## 2021-11-03 ASSESSMENT — PAIN SCALES - WONG BAKER
WONGBAKER_NUMERICALRESPONSE: 0
WONGBAKER_NUMERICALRESPONSE: 0

## 2021-11-03 ASSESSMENT — PAIN DESCRIPTION - LOCATION: LOCATION: ANKLE

## 2021-11-03 ASSESSMENT — PAIN DESCRIPTION - FREQUENCY: FREQUENCY: CONTINUOUS

## 2021-11-03 NOTE — PROGRESS NOTES
Occupational Therapy   Occupational Therapy Initial Assessment/Treatment/Discharge  Date: 11/3/2021   Patient Name: Hoang Edwards  MRN: [id number]     : 1955    Date of Service: 11/3/2021    Discharge Recommendations:      Hoang Edwards scored a 22/24 on the AM-PAC ADL Inpatient form. Current research shows that an AM-PAC score of 18 or greater is typically associated with a discharge to the patient's home setting. Please see assessment section for further patient specific details. OT Equipment Recommendations  Equipment Needed: No    Assessment   Assessment: Pt was referred to therapy following R TKA revision. At baseline, pt was independent in ADLs and functional mobility. Pt currently functioning slightly below baseline, but anticipate return to baseline with continued OOB activity. No skilled OT services indicated. Prognosis: Good  Decision Making: Medium Complexity  OT Education: OT Role;Plan of Care;ADL Adaptive Strategies;Precautions;Transfer Training  Patient Education: Pt demonstrated understanding of education  REQUIRES OT FOLLOW UP: No  Activity Tolerance  Activity Tolerance: Patient Tolerated treatment well  Safety Devices  Safety Devices in place: Yes  Type of devices: Call light within reach; Chair alarm in place; Left in chair;Nurse notified (Tray table positioned next to pt)          Restrictions  Position Activity Restriction  Other position/activity restrictions: 1)  Ambulate in room progressing to hallway with assistive device four times daily (including PT) when PT advises. 2)  Bathroom privileges with assistance. 3) Up in bedside chair at least TID as tolerated. WBAT    Subjective   General  Chart Reviewed: Yes  Patient assessed for rehabilitation services?: Yes  Additional Pertinent Hx: Pt is 73 yo male admitted 21 following R TKA revision.  PMH includes arthritis, benign prostatic hyperplasia, elevated PSA, angioedema, pure hypertriglyceridemia, HTN, thyroid disease, L TKA, R TKA, cholecystectomy, and elbow surgery. Family / Caregiver Present: No  Referring Practitioner: Dr. Araceli Jack  Diagnosis: Mechanical loosening of internal right knee prosthetic joint, initial encounter  Subjective  Subjective: Pt was in chair upon arrival. Pt stated that he was feeling good.   Patient Currently in Pain: Denies    Social/Functional History  Social/Functional History  Lives With: Spouse (present 24/7)  Type of Home: Apartment  Home Layout: One level  Home Access: Level entry  Bathroom Shower/Tub: Tub/Shower unit  Bathroom Toilet: Standard (vanity near sink to push up from)  Home Equipment: Crutches  ADL Assistance: Independent (wife was present recently to make sure he got in/out of shower ok)  Homemaking Assistance: Independent (wife does most)  Homemaking Responsibilities: Yes  Ambulation Assistance: Independent (used crutches the past few days before surgery)  Transfer Assistance: Independent  Active : Yes  Occupation: Retired  Type of occupation: was a   Additional Comments: denies recent falls       Objective   Vision: Impaired  Vision Exceptions: Wears glasses at all times  Hearing: Within functional limits          Balance  Sitting Balance: Independent  Standing Balance: Stand by assistance  Standing Balance  Time: ~7min  Activity: Bathroom mobility x2, ADLs  Functional Mobility  Functional - Mobility Device: Rolling Walker  Activity: To/from bathroom (x2)  Assist Level: Stand by assistance  Toilet Transfers  Toilet - Technique: Ambulating  Equipment Used: Standard toilet (Grab bar)  Toilet Transfer: Stand by assistance  ADL  Grooming: Stand by assistance  UE Bathing: Stand by assistance (Used bath wipes for UB while standing at sink)  LE Bathing: Stand by assistance (Used bath wipes for svetlana area while standing at sink, wiped legs and feet while sitting in chair)  UE Dressing: Setup (Donned shirt while sitting in chair)  LE Dressing: Stand by assistance (Donned underwear, pants, and socks while sitting in chair. SBA while standing to pull up underwear and pants.)  Toileting: Stand by assistance (Urinated while standing)  Tone RUE  RUE Tone: Normotonic  Tone LUE  LUE Tone: Normotonic  Coordination  Movements Are Fluid And Coordinated: Yes     Bed mobility  Scooting: Supervision (In chair)  Transfers  Stand Step Transfers: Stand by assistance  Sit to stand: Stand by assistance  Stand to sit: Stand by assistance     Cognition  Overall Cognitive Status: WNL    LUE AROM (degrees)  LUE AROM : WFL  RUE AROM (degrees)  RUE AROM : WFL  LUE Strength  Gross LUE Strength: WFL  RUE Strength  Gross RUE Strength: WFL    Pt was seen for OT evaluation, treatment, and discharge including ADL training and functional mobility. Plan   Plan  Plan Comment: Discharge from acute OT services    AM-PAC Score        AM-[facility] Inpatient Daily Activity Raw Score: 22 (11/03/21 1212)  -PAC Inpatient ADL T-Scale Score : 47.1 (11/03/21 1212)  ADL Inpatient CMS 0-100% Score: 25.8 (11/03/21 1212)  ADL Inpatient CMS G-Code Modifier : Mica Hoang (11/03/21 1212)    Goals  Patient Goals   Patient goals : No goals indicated       Therapy Time   Individual Concurrent Group Co-treatment   Time In 1038         Time Out 1108         Minutes 30         Timed Code Treatment Minutes: 20 Minutes    Total Treatment Time: [address]  A licensed therapist was present, directed the patient's care, made skilled judgement, and was responsible for assessment and treatment of the patient.

## 2021-11-03 NOTE — PLAN OF CARE
Problem: Discharge Planning:  Goal: Discharged to appropriate level of care  Description: Discharged to appropriate level of care  Outcome: Ongoing     Problem: Infection - Surgical Site:  Goal: Will show no infection signs and symptoms  Description: Will show no infection signs and symptoms  Outcome: Ongoing     Problem: Pain - Acute:  Goal: Pain level will decrease  Description: Pain level will decrease  11/3/2021 0938 by Dio Mendoza RN  Outcome: Ongoing

## 2021-11-03 NOTE — PROGRESS NOTES
Pt A/O x4. VSS on room air. Voiding adequately via urinal. Pain controlled with PRN oral medications. Tolerating diet and fluids well. Fall precautions are in place.

## 2021-11-03 NOTE — PLAN OF CARE
Problem: Pain - Acute:  Goal: Pain level will decrease  Description: Pain level will decrease  Outcome: Ongoing  Note: Pt educated on 0-10 pain rating scale. Pain controlled with PRN oral pain medications. Will continue to monitor. Problem: Falls - Risk of:  Goal: Will remain free from falls  Description: Will remain free from falls  Outcome: Ongoing  Note: Fall precautions are in place. Pt is in bed with bed alarm on. Using call light appropriately. Call light and belongings are within reach.

## 2021-11-03 NOTE — PROGRESS NOTES
4 Eyes Admission Assessment     I agree as the admission nurse that 2 RN's have performed a thorough Head to Toe Skin Assessment on the patient. ALL assessment sites listed below have been assessed on admission. Areas assessed by both nurses:  [x]   Head, Face, and Ears   [x]   Shoulders, Back, and Chest  [x]   Arms, Elbows, and Hands   [x]   Coccyx, Sacrum, and Ischium  [x]   Legs, Feet, and Heels        Does the Patient have Skin Breakdown?   No         Obed Prevention initiated:  No   Wound Care Orders initiated:  No      [facility] nurse consulted for Pressure Injury (Stage 3,4, Unstageable, DTI, NWPT, and Complex wounds) or Obed score 18 or lower:  No      Nurse 1 eSignature: Electronically signed by Claudetta Saucer, RN on 11/2/21 at 9:05 PM EDT    **SHARE this note so that the co-signing nurse is able to place an eSignature**    Nurse 2 eSignature: Electronically signed by Carmen Reeves RN on 11/2/21 at 9:31 PM EDT

## 2021-11-03 NOTE — CARE COORDINATION
Case Management            Discharge Note                    Date / Time of Note: 11/3/2021 1:07 PM                  Discharge Note Completed by: Stanley Argueta MSW    Patient Name: Gely Ramachandran   YOB: 1955  Diagnosis: Mechanical loosening of internal right knee prosthetic joint, initial encounter [facility]) Arjun Hernandez   Date / Time: 11/2/2021  9:51 AM    Current PCP: Chrissy Lee MD  Clinic patient: No    Hospitalization in the last 30 days: No    Advance Directives:  Code Status: Full Code  [address] DNR form completed and on chart: Not Indicated    Financial:  Payor: MEDICARE / Plan: MEDICARE PART A AND B / Product Type: *No Product type* /      Pharmacy:    [facility] [address] [id number]  Phone: [phone] Fax: [phone]      Assistance purchasing medications?: Potential Assistance Purchasing Medications: No  Assistance provided by Case Management: None at this time    Does patient want to participate in local refill/ meds to beds program?: Not Assessed    Meds To Beds General Rules:  1. Can ONLY be done Monday- Friday between 8:30am-5pm  2. Prescription(s) must be in pharmacy by 3pm to be filled same day  3. Copy of patient's insurance/ prescription drug card and patient face sheet must be sent along with the prescription(s)  4. Cost of Rx cannot be added to hospital bill. If financial assistance is needed, please contact unit  or ;  or  CANNOT provide pharmacy voucher for patients co-pays  5.  Patients can then  the prescription on their way out of the hospital at discharge, or pharmacy can deliver to the bedside if staff is available. (payment due at time of pick-up or delivery - cash, check, or card accepted)     Able to afford home medications/ co-pay costs: Yes    ADLS:  Current PT AM-PAC Score: 18 /24  Current OT AM-PAC Score: 22 /24      Discharge Disposition: Home- No Services Needed    LOC at discharge: Not Applicable  PRINCE Completed: Not Indicated    Notification completed in HENS/PAS?:  Not Applicable    IMM Completed:   Not Indicated    Transportation:  Transportation Plan for discharge: family   Mode of Transport: Private Car    Home Care:  Home Care ordered at discharge: Not Indicated    Durable Medical Equipment:  DME Provider: Terrell Gonzalez obtained during hospitalization: Rolling Walker     Additional CM Notes:   SW rounded and spoke with Dr. Hany Chaudhry team. Patient medically ready for discharge today. Patient to return home and begin outpatient therapy. Recommendations from therapy for a rolling walker. SW arranged for delivery of a rolling walker to patient's room. It was delivered prior to discharge. No other SW needs. The Plan for Transition of Care is related to the following treatment goals Mechanical loosening of internal right knee prosthetic joint, initial encounter [facility]) [X59.815W]      The Patient and/or patient representative  was provided with a choice of provider and agrees with the discharge plan Yes    Freedom of choice list was provided with basic dialogue that supports the patient's individualized plan of care/goals and shares the quality data associated with the providers.  Yes    Care Transitions patient: Yes    MAXWELL Owens  [facility]   Case Management Department  Ph: [phone]

## 2021-11-03 NOTE — PROGRESS NOTES
Physical Therapy    Facility/Department: [facility]  Initial Assessment and treatment    NAME: Esme Porter  : 1955  MRN: [id number]    Date of Service: 11/3/2021    Discharge Recommendations:Dangelo Smith scored a 18/24 on the AM-PAC short mobility form. Current research shows that an AM-PAC score of 18 or greater is typically associated with a discharge to the patient's home setting. Based on the patient's AM-PAC score and their current functional mobility deficits, it is recommended that the patient have 2-3 sessions per week of Physical Therapy at d/c to increase the patient's independence. At this time, this patient demonstrates the endurance and safety to discharge home with OP services and a follow up treatment frequency of 2-3x/wk. Please see assessment section for further patient specific details. If patient discharges prior to next session this note will serve as a discharge summary. Please see below for the latest assessment towards goals. PT Equipment Recommendations  Equipment Needed: Yes  Mobility Devices: Thamas Rafia: Rolling    Assessment   Body structures, Functions, Activity limitations: Decreased functional mobility   Assessment: The pt is a 75 y/o male who presents following R TKR. He is typically independent with all mobility without AD. He demonstrates increased pain, impaired balance, impaired gait mechanics. He would benefit from / supervision upon return home which he has from wife. Pt needs RW at discharge. Pt plans to have OP PT at discharge.   Treatment Diagnosis: impaired mobility 2/2 R TKR  Prognosis: Good  Decision Making: Low Complexity  PT Education: Goals;Plan of Care;General Safety;PT Role;Transfer Training;Functional Mobility Training;Gait Training  Patient Education: pt verbalized understanding  REQUIRES PT FOLLOW UP: Yes  Activity Tolerance  Activity Tolerance: Patient Tolerated treatment well       Patient Diagnosis(es): The primary encounter diagnosis was Mechanical loosening of internal right knee prosthetic joint, sequela. A diagnosis of Mechanical loosening of internal right knee prosthetic joint, initial encounter [facility]) was also pertinent to this visit. has a past medical history of Allergic drug reaction, Angioedema, Arthritis, Benign prostatic hyperplasia without lower urinary tract symptoms, Elevated PSA, Hypertension, Pure hypertriglyceridemia, and Thyroid disease. has a past surgical history that includes knee surgery (Left, 2019); Cholecystectomy; Prostate biopsy; Elbow surgery; joint replacement (Right, 2005); and Revision total knee arthroplasty (Right, 11/2/2021). Restrictions  Position Activity Restriction  Other position/activity restrictions: 1)  Ambulate in room progressing to hallway with assistive device four times daily (including PT) when PT advises. 2)  Bathroom privileges with assistance. 3) Up in bedside chair at least TID as tolerated. WBAT  Vision/Hearing  Vision: Impaired  Vision Exceptions: Wears glasses at all times  Hearing: Within functional limits     Subjective  General  Chart Reviewed: Yes  Patient assessed for rehabilitation services?: Yes  Additional Pertinent Hx: Pt presents following Revision right total knee arthroplasty, femoraland tibial components. 2. Extensive Synovectomy of right knee on 11/2. Family / Caregiver Present: No  Diagnosis: mechanical loosening of internal R knee prosthetic joint  Follows Commands: Within Functional Limits  General Comment  Comments: The pt presents supine in bed and willing to work with therapist.  Subjective  Subjective: \"They tell me I don't seem 66, I laugh too much. \"  Pain Screening  Patient Currently in Pain: Denies  Vital Signs  Patient Currently in Pain: Denies       Orientation  Orientation  Overall Orientation Status: Within Functional Limits  Social/Functional History  Social/Functional History  Lives With: Spouse (present 24/7)  Type of Home: Apartment  Home Layout: One level  Home Access: Level entry  Bathroom Shower/Tub: Tub/Shower unit  Bathroom Toilet: Standard (vanity near sink to push up from)  Home Equipment: Crutches  ADL Assistance: Independent (wife was present recently to make sure he got in/out of shower ok)  Homemaking Assistance: Independent (wife does most)  Homemaking Responsibilities: Yes  Ambulation Assistance: Independent (used crutches the past few days before surgery)  Transfer Assistance: Independent  Active : Yes  Occupation: Retired  Type of occupation: was a   Additional Comments: denies recent falls  Cognition   Cognition  Overall Cognitive Status: WNL    Objective  Strength RLE  Strength RLE: Exception  Comment: 2/2 surgery  Strength LLE  Strength LLE: WFL        Bed mobility  Supine to Sit: Supervision (HOB slightly elevated)  Scooting: Supervision (to EOB)  Transfers  Sit to Stand: Stand by assistance (from bed, from toilet)  Stand to sit: Stand by assistance  Comment: with use of RW  Ambulation  Ambulation?: Yes  Ambulation 1  Surface: level tile  Device: Rolling Walker  Assistance: Contact guard assistance;Stand by assistance (CGA progressing to SBA)  Quality of Gait: pt with antalgic gait and decreased WB through R LE, decreased step length on the L LE, slow cody.  Pt improving form with cueing  Distance: 10'+200'  Comments: steady, no LOB  Stairs/Curb  Stairs?: No     Balance  Sitting - Static: Good  Sitting - Dynamic: Fair;+  Standing - Static: Fair  Standing - Dynamic: Fair;-  Comments: Pt with supervision sitting balance EOB and CGA standing balance    Treatment:  Functional mobility training and pt education    Plan   Plan  Times per week: 7  Times per day: Twice a day  Current Treatment Recommendations: Transfer Training, Strengthening, Endurance Training, Neuromuscular Re-education, Cognitive Reorientation, Patient/Caregiver Education & Training, Balance Training, Gait Training, Functional Mobility Training, Safety Education & Training  Safety Devices  Type of devices: Call light within reach, Chair alarm in place, Left in chair, Nurse notified    AM-PAC Score  AM-PAC Inpatient Mobility Raw Score : 18 (11/03/21 1237)  AM-PAC Inpatient T-Scale Score : 43.63 (11/03/21 1237)  Mobility Inpatient CMS 0-100% Score: 46.58 (11/03/21 1237)  Mobility Inpatient CMS G-Code Modifier : CK (11/03/21 1237)          Goals  Short term goals  Time Frame for Short term goals: By discharge  Short term goal 1: The pt will perform bed mobility with independence  Short term goal 2: The pt will transfer with supervision and LRAD  Short term goal 3: The pt will ambulate with RW and supervision x 150'  Patient Goals   Patient goals :  To go home       Therapy Time   Individual Concurrent Group Co-treatment   Time In 0950         Time Out 1017         Minutes 27         Timed Code Treatment Minutes: 12 Minutes    Timed Code Treatment Minutes:  12 Minutes    Total Treatment Minutes:  27 minutes      Martin Costello, PT

## 2021-11-09 LAB
ANAEROBIC CULTURE: ABNORMAL
CULTURE SURGICAL: ABNORMAL
GRAM STAIN RESULT: ABNORMAL
ORGANISM: ABNORMAL

## 2021-11-11 NOTE — DISCHARGE SUMMARY
[facility] ORTHOPAEDICS DISCHARGE SUMMARY    Pre Operative Diagnosis  Mechanical loosening of internal right knee prosthetic joint, initial encounter ([address] [H67.742Z]    Post Operative Diagnosis   Mechanical loosening of internal right knee prosthetic joint, initial encounter [facility]) [V64.984L    Discharge Diagnosis  Mechanical loosening of internal right knee prosthetic joint, initial encounter [facility]) [D06.860T    Procedure Preformed  Procedure(s):  REVISION RIGHT TOTAL KNEE ARTHROPLASTY    Surgeon  Mercedes Goodman MD     Medical course: as per medical records       The patient was taken to the operating room  where the aforementioned procedure was preformed. The patient was taken to the post operative anesthesia recovery unit in stable condition. The patient was then transferred to the orthopaedic floor for post operative pain management and convalesce. ( x )The patient was placed on anticoagulation therapy for DVT prophylaxis       The patient was discharged in stable condition. Please see medical reconciliation for discharge medications. The discharge instructions were explained to the patient and the family. The patient will follow up in the office in 3 weeks for repeat examination and xray .

## 2021-11-22 LAB
ANAEROBIC CULTURE: NORMAL
ANAEROBIC CULTURE: NORMAL
CULTURE SURGICAL: NORMAL
CULTURE SURGICAL: NORMAL
GRAM STAIN RESULT: NORMAL
GRAM STAIN RESULT: NORMAL

## 2021-12-10 DIAGNOSIS — E03.9 ACQUIRED HYPOTHYROIDISM: ICD-10-CM

## 2021-12-10 RX ORDER — AMLODIPINE BESYLATE 10 MG/1
10 TABLET ORAL DAILY
Qty: 90 TABLET | Refills: 0 | Status: SHIPPED | OUTPATIENT
Start: 2021-12-10 | End: 2021-12-15 | Stop reason: SDUPTHER

## 2021-12-15 ENCOUNTER — OFFICE VISIT (OUTPATIENT)
Dept: PRIMARY CARE CLINIC | Age: 66
End: 2021-12-15
Payer: MEDICARE

## 2021-12-15 VITALS
RESPIRATION RATE: 16 BRPM | DIASTOLIC BLOOD PRESSURE: 70 MMHG | TEMPERATURE: 97.6 F | BODY MASS INDEX: 31.08 KG/M2 | WEIGHT: 222 LBS | OXYGEN SATURATION: 97 % | HEIGHT: 71 IN | HEART RATE: 74 BPM | SYSTOLIC BLOOD PRESSURE: 130 MMHG

## 2021-12-15 DIAGNOSIS — R73.03 PREDIABETES: ICD-10-CM

## 2021-12-15 DIAGNOSIS — G62.9 PERIPHERAL POLYNEUROPATHY: ICD-10-CM

## 2021-12-15 DIAGNOSIS — I10 ESSENTIAL HYPERTENSION: ICD-10-CM

## 2021-12-15 DIAGNOSIS — E78.1 PURE HYPERTRIGLYCERIDEMIA: ICD-10-CM

## 2021-12-15 DIAGNOSIS — E03.9 ACQUIRED HYPOTHYROIDISM: Primary | ICD-10-CM

## 2021-12-15 PROBLEM — T84.032A MECHANICAL LOOSENING OF INTERNAL RIGHT KNEE PROSTHETIC JOINT (HCC): Status: RESOLVED | Noted: 2021-11-02 | Resolved: 2021-12-15

## 2021-12-15 LAB
A/G RATIO: 1.2 (ref 1.1–2.2)
ALBUMIN SERPL-MCNC: 4.5 G/DL (ref 3.4–5)
ALP BLD-CCNC: 129 U/L (ref 40–129)
ALT SERPL-CCNC: 29 U/L (ref 10–40)
ANION GAP SERPL CALCULATED.3IONS-SCNC: 15 MMOL/L (ref 3–16)
AST SERPL-CCNC: 25 U/L (ref 15–37)
BASOPHILS ABSOLUTE: 0.1 K/UL (ref 0–0.2)
BASOPHILS RELATIVE PERCENT: 1.1 %
BILIRUB SERPL-MCNC: 0.9 MG/DL (ref 0–1)
BUN BLDV-MCNC: 13 MG/DL (ref 7–20)
CALCIUM SERPL-MCNC: 9.9 MG/DL (ref 8.3–10.6)
CHLORIDE BLD-SCNC: 97 MMOL/L (ref 99–110)
CHOLESTEROL, TOTAL: 191 MG/DL (ref 0–199)
CO2: 23 MMOL/L (ref 21–32)
CREAT SERPL-MCNC: 0.6 MG/DL (ref 0.8–1.3)
EOSINOPHILS ABSOLUTE: 0.1 K/UL (ref 0–0.6)
EOSINOPHILS RELATIVE PERCENT: 0.9 %
GFR AFRICAN AMERICAN: >60
GFR NON-AFRICAN AMERICAN: >60
GLUCOSE BLD-MCNC: 89 MG/DL (ref 70–99)
HCT VFR BLD CALC: 43.1 % (ref 40.5–52.5)
HDLC SERPL-MCNC: 48 MG/DL (ref 40–60)
HEMOGLOBIN: 14.9 G/DL (ref 13.5–17.5)
LDL CHOLESTEROL CALCULATED: 111 MG/DL
LYMPHOCYTES ABSOLUTE: 2.1 K/UL (ref 1–5.1)
LYMPHOCYTES RELATIVE PERCENT: 36.6 %
MCH RBC QN AUTO: 32.1 PG (ref 26–34)
MCHC RBC AUTO-ENTMCNC: 34.6 G/DL (ref 31–36)
MCV RBC AUTO: 92.9 FL (ref 80–100)
MONOCYTES ABSOLUTE: 0.6 K/UL (ref 0–1.3)
MONOCYTES RELATIVE PERCENT: 9.6 %
NEUTROPHILS ABSOLUTE: 3 K/UL (ref 1.7–7.7)
NEUTROPHILS RELATIVE PERCENT: 51.8 %
PDW BLD-RTO: 14.6 % (ref 12.4–15.4)
PLATELET # BLD: 217 K/UL (ref 135–450)
PMV BLD AUTO: 8.3 FL (ref 5–10.5)
POTASSIUM SERPL-SCNC: 4.3 MMOL/L (ref 3.5–5.1)
RBC # BLD: 4.64 M/UL (ref 4.2–5.9)
SODIUM BLD-SCNC: 135 MMOL/L (ref 136–145)
TOTAL PROTEIN: 8.4 G/DL (ref 6.4–8.2)
TRIGL SERPL-MCNC: 162 MG/DL (ref 0–150)
TSH REFLEX: 1.91 UIU/ML (ref 0.27–4.2)
VLDLC SERPL CALC-MCNC: 32 MG/DL
WBC # BLD: 5.8 K/UL (ref 4–11)

## 2021-12-15 PROCEDURE — 4040F PNEUMOC VAC/ADMIN/RCVD: CPT | Performed by: FAMILY MEDICINE

## 2021-12-15 PROCEDURE — G8484 FLU IMMUNIZE NO ADMIN: HCPCS | Performed by: FAMILY MEDICINE

## 2021-12-15 PROCEDURE — 36415 COLL VENOUS BLD VENIPUNCTURE: CPT | Performed by: FAMILY MEDICINE

## 2021-12-15 PROCEDURE — G8427 DOCREV CUR MEDS BY ELIG CLIN: HCPCS | Performed by: FAMILY MEDICINE

## 2021-12-15 PROCEDURE — G8417 CALC BMI ABV UP PARAM F/U: HCPCS | Performed by: FAMILY MEDICINE

## 2021-12-15 PROCEDURE — 99214 OFFICE O/P EST MOD 30 MIN: CPT | Performed by: FAMILY MEDICINE

## 2021-12-15 PROCEDURE — 3017F COLORECTAL CA SCREEN DOC REV: CPT | Performed by: FAMILY MEDICINE

## 2021-12-15 PROCEDURE — 1123F ACP DISCUSS/DSCN MKR DOCD: CPT | Performed by: FAMILY MEDICINE

## 2021-12-15 PROCEDURE — 1036F TOBACCO NON-USER: CPT | Performed by: FAMILY MEDICINE

## 2021-12-15 RX ORDER — AMLODIPINE BESYLATE 10 MG/1
10 TABLET ORAL DAILY
Qty: 90 TABLET | Refills: 1 | Status: SHIPPED | OUTPATIENT
Start: 2021-12-15

## 2021-12-15 RX ORDER — LEVOTHYROXINE SODIUM 0.07 MG/1
75 TABLET ORAL DAILY
Qty: 90 TABLET | Refills: 1 | Status: SHIPPED | OUTPATIENT
Start: 2021-12-15

## 2021-12-15 NOTE — PROGRESS NOTES
PROGRESS NOTE  Date of Service:  12/15/2021    SUBJECTIVE:  Patient ID: Neris Briseno is a 74 y.o. male    HPI:   Hypertension - well controlled at home 120-130/70-80. No difficulty with medication    Hypothyroidism- on replacement. Denies cold intolerance/ fatigue. 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 Continued numbness in both feet at times    Hyperlipidemia- elevated trg. no current medication       Patient's medications, allergies, past medical, surgical, social and family histories were reviewed and updated as appropriate. OBJECTIVE:  Vitals:    12/15/21 0904   BP: 130/70   Site: Right Upper Arm   Position: Sitting   Cuff Size: Large Adult   Pulse: 74   Resp: 16   Temp: 97.6 °F (36.4 °C)   TempSrc: Temporal   SpO2: 97%   Weight: 222 lb (100.7 kg)   Height: 5' 11\" (1.803 m)      Body mass index is 30.96 kg/m². Physical Exam  Vitals reviewed. Constitutional:       Appearance: Normal appearance. HENT:      Head: Normocephalic and atraumatic. Right Ear: External ear normal.      Left Ear: External ear normal.   Eyes:      General: No scleral icterus. Conjunctiva/sclera: Conjunctivae normal.   Neck:      Thyroid: No thyroid mass, thyromegaly or thyroid tenderness. Cardiovascular:      Rate and Rhythm: Normal rate and regular rhythm. Pulses: Normal pulses. Heart sounds: Normal heart sounds. Pulmonary:      Effort: Pulmonary effort is normal.      Breath sounds: Normal breath sounds. Abdominal:      Palpations: Abdomen is soft. Musculoskeletal:      Right lower leg: No edema. Left lower leg: No edema. Lymphadenopathy:      Cervical: No cervical adenopathy. Neurological:      General: No focal deficit present.       Mental Status: He is alert and oriented to person, place, and

## 2021-12-16 ENCOUNTER — TELEPHONE (OUTPATIENT)
Dept: PRIMARY CARE CLINIC | Age: 66
End: 2021-12-16

## 2021-12-16 DIAGNOSIS — E78.2 MIXED HYPERLIPIDEMIA: Primary | ICD-10-CM

## 2021-12-16 RX ORDER — ATORVASTATIN CALCIUM 20 MG/1
20 TABLET, FILM COATED ORAL DAILY
Qty: 30 TABLET | Refills: 1 | Status: SHIPPED | OUTPATIENT
Start: 2021-12-16

## 2021-12-16 NOTE — TELEPHONE ENCOUNTER
Patient's wife called to see if the patient changed his diet and took his fish oil vitamin again to try and lower his cholesterol , if he didn't have to take the new medication that was prescribed today

## 2021-12-16 NOTE — TELEPHONE ENCOUNTER
Pt wife returning phone call. Physician note read per the physician. Pt wife understands note. Pt wife has no questions and will call back if  has questions.

## 2021-12-16 NOTE — TELEPHONE ENCOUNTER
Certainly dietary changes are always recommended however his other risk factors of high blood pressure, highest blood sugar in this he has an increased risk for coronary vascular disease. His risk score is 18%. Statin medication is recommended. Fish oil can help lower triglycerides but does not really affect LDL.

## 2022-04-11 ENCOUNTER — HOSPITAL ENCOUNTER (OUTPATIENT)
Age: 67
Discharge: HOME | End: 2022-04-11
Payer: MEDICARE

## 2022-04-11 ENCOUNTER — TELEPHONE (OUTPATIENT)
Dept: PRIMARY CARE CLINIC | Age: 67
End: 2022-04-11

## 2022-04-11 ENCOUNTER — HOSPITAL ENCOUNTER (OUTPATIENT)
Dept: GENERAL RADIOLOGY | Age: 67
Discharge: HOME | End: 2022-04-11
Payer: MEDICARE

## 2022-04-11 DIAGNOSIS — R22.0 LOCALIZED SWELLING, MASS AND LUMP, HEAD: ICD-10-CM

## 2022-04-11 PROCEDURE — 73030 X-RAY EXAM OF SHOULDER: CPT

## 2022-04-11 NOTE — TELEPHONE ENCOUNTER
Otoniel Osorio 25 request     Scanned to [employer]  and faxed to [facility]

## 2022-04-13 ENCOUNTER — HOSPITAL ENCOUNTER (OUTPATIENT)
Dept: NEUROLOGY | Age: 67
Discharge: HOME | End: 2022-04-13
Payer: MEDICARE

## 2022-04-13 DIAGNOSIS — R20.2 PARESTHESIA: ICD-10-CM

## 2022-04-13 PROCEDURE — 95910 NRV CNDJ TEST 7-8 STUDIES: CPT

## 2022-04-13 PROCEDURE — 95886 MUSC TEST DONE W/N TEST COMP: CPT

## 2022-04-13 NOTE — PROCEDURES
Test Date:  2022    Patient: Sherrie Elaine : 1955 Physician: Franco Justice DO   Sex: Male ID#:  Ref Phys: Queen Yuli MD     Patient Complaints:  Patient is a 74year-old male who presents with pain in the right upper extremity with numbness of right hand. Onset couple months ago     Patient History / Exam:  PMH: no diabetes, + hypothyroidism PE reflexes trace, + thumb opposition weakness     NCV & EMG Findings:  Evaluation of the left median (APB) motor nerve showed prolonged distal onset latency (5.7 ms) and decreased conduction velocity (47 m/s). The right median (APB) motor nerve showed prolonged distal onset latency (8.4 ms), reduced amplitude (2.5 mV), and decreased conduction velocity (48 m/s). The left ulnar (ADM) motor nerve showed prolonged distal onset latency (4.6 ms). The left median sensory and the left ulnar sensory nerves showed prolonged distal peak latency (L4.0, L3.8 ms) and decreased conduction velocity (L35, L37 m/s). The right median sensory nerve showed no response. The right ulnar sensory nerve showed prolonged distal peak latency (3.7 ms), reduced amplitude (4 µV), and decreased conduction velocity (38 m/s). All remaining nerves (as indicated in the following tables) were within normal limits. All examined muscles (as indicated in the following table) showed no evidence of electrical instability. Impression:  Study is consistent with bilateral carpal tunnel syndrome moderately severe in nature. no evidence of an acute radiculopathy or other entrapment neuropathy.          Franco Justice DO        Nerve Conduction Studies  Motor Nerve Results      Latency Amplitude F-Lat Segment Distance CV Comment   Site (ms) Norm (mV) Norm (ms)  (cm) (m/s) Norm    Left Median (APB) Motor   Wrist 5.7  < 4.2 6.7  > 5.0         Elbow 11.0 - 4.2 -  Elbow-Wrist 25 47  > 50    Right Median (APB) Motor   Wrist 8.4  < 4.2 2.5  > 5.0         Elbow 13.6 - 1.56 - Elbow-Wrist 25 48  > 50    Left Ulnar (ADM) Motor   Wrist 4.6  < 4.2 5.6  > 3.0         Bel Elbow 9.5 - 5.9 -  Bel Elbow-Wrist 25 51  > 50    Abv Elbow 10.4 - 5.2 -  Abv Elbow-Bel Elbow 6 67  > 48    Right Ulnar (ADM) Motor   Wrist 3.7  < 4.2 5.9  > 3.0         Bel Elbow 9.7 - 3.8 -  Bel Elbow-Wrist 30 50  > 50    Abv Elbow 10.5 - 5.0 -  Abv Elbow-Bel Elbow 6 75  > 48      Sensory Nerve Results      Latency (Peak) Amplitude (P-P) Segment Distance CV Comment   Site (ms) Norm (µV) Norm  (cm) (m/s) Norm    Left Median Sensory   Wrist-Dig II 4.0  < 3.6 23  > 10 Wrist-Dig II 14 35  > 39    Right Median Sensory   Wrist-Dig II NR  < 3.6 NR  > 10 Wrist-Dig II 14 NR  > 39    Left Ulnar Sensory   Wrist-Dig V 3.8  < 3.7 20  > 15 Wrist-Dig V 14 37  > 38    Right Ulnar Sensory   Wrist-Dig V 3.7  < 3.7 4  > 15 Wrist-Dig V 14 38  > 38        Electromyography     Side Muscle Nerve Root Ins Act Fibs Psw Amp Dur Poly Recrt Int Adriana [place] Comment   Right Deltoid Axillary C5-C6 Nml Nml Nml Nml Nml 0 Nml Nml    Right Biceps Musculocut C5-C6 Nml Nml Nml Nml Nml 0 Nml Nml    Right Triceps Radial C6-C8 Nml Nml Nml Nml Nml 0 Nml Nml    Right Brachiorad Radial C5-C6 Nml Nml Nml Nml Nml 0 Nml Nml    Right Pronator Teres Median C6-C7 Nml Nml Nml Nml Nml 0 Nml Nml    Right EIP Post Interosseous,  R... C7-C8 Nml Nml Nml Nml Nml 0 Nml Nml    Right APB Median C8-T1 Nml Nml Nml Nml Nml 0 Nml Nml    Right FDI Ulnar C8-T1 Nml Nml Nml Nml Nml 0 Nml Nml    Right Cervical Paraspinal (Uppe. .. Rami C1-C3 Nml Nml Nml         Right Cervical Paraspinal (Mid) Rami C4-C6 Nml Nml Nml         Right Cervical Paraspinal (Chris Greaser. ..  Rami C7-C8 Nml Nml Nml         Left Deltoid Axillary C5-C6 Nml Nml Nml Nml Nml 0 Nml Nml    Left Biceps Musculocut C5-C6 Nml Nml Nml Nml Nml 0 Nml Nml    Left Triceps Radial C6-C8 Nml Nml Nml Nml Nml 0 Nml Nml    Left Brachiorad Radial C5-C6 Nml Nml Nml Nml Nml 0 Nml Nml    Left Pronator Teres Median C6-C7 Nml Nml Nml Nml Nml 0 Nml Nml    Left EIP Post Interosseous,  R... C7-C8 Nml Nml Nml Nml Nml 0 Nml Nml    Left APB Median C8-T1 Nml Nml Nml Nml Nml 0 Nml Nml    Left FDI Ulnar C8-T1 Nml Nml Nml Nml Nml 0 Nml Nml    Left Cervical Paraspinal (Uppe. .. Rami C1-C3 Nml Nml Nml         Left Cervical Paraspinal (Mid) Rami C4-C6 Nml Nml Nml         Left Cervical Paraspinal (Sherren Haff. ..  Rami C7-C8 Nml Nml Nml           Electronically signed by James Toscano DO on 4/13/2022 at 9:30 AM

## 2022-04-19 ENCOUNTER — HOSPITAL ENCOUNTER (OUTPATIENT)
Dept: GENERAL RADIOLOGY | Age: 67
Discharge: HOME | End: 2022-04-19
Payer: MEDICARE

## 2022-04-19 ENCOUNTER — HOSPITAL ENCOUNTER (OUTPATIENT)
Age: 67
Discharge: HOME | End: 2022-04-19
Payer: MEDICARE

## 2022-04-19 DIAGNOSIS — M25.531 RIGHT WRIST PAIN: ICD-10-CM

## 2022-04-19 PROCEDURE — 73120 X-RAY EXAM OF HAND: CPT

## 2022-04-19 PROCEDURE — 73100 X-RAY EXAM OF WRIST: CPT

## 2022-06-14 ENCOUNTER — TELEPHONE (OUTPATIENT)
Dept: PRIMARY CARE CLINIC | Age: 67
End: 2022-06-14

## 2022-08-21 NOTE — DISCHARGE SUMMARY
Hospital Medicine Discharge Summary    Patient: Loretta Calvillo     Gender: male  : 1955   Age: 70 y.o. MRN: [id number]    Admitting Physician: Sabra Pierce MD  Discharge Physician: Sabra Pierce MD     Code Status: Full code    Admit Date: 2019   Discharge Date: 2019      Disposition:  Home    Discharge Diagnoses: Active Hospital Problems    Diagnosis Date Noted    Angioedema [T78. 3XXA] 05/10/2019    Rash [R21]     Blood blister [T14. 8XXA]     Urticaria [L50.9]     Elevated C-reactive protein (CRP) [R79.82]     History of arthroplasty of left knee [Z96.652]     Elevated sed rate [R70.0]     Overweight [E66.3]     Allergic reaction caused by a drug [T78.40XA] 2019    Failure of outpatient treatment [Z78.9] 2019    Hypotension [I95.9] 2019    Hyponatremia [E87.1] 2019    Dehydration [E86.0] 2019    HTN (hypertension) [I10] 2019    Obesity (BMI 30-39. 9) [E66.9] 2019    Leukocytosis [D72.829] 2019    Anemia [D64.9] 2019    Coagulopathy ([address] [D68.9] 2019    Hypothyroid [E03.9] 2019    Allergic drug reaction [T78.40XA] 2019       Follow-up appointments:  one week    Outpatient to do list: PCP SHOULD ADD LISINOPRIL TO LIST OF ALLERGIES FOR ANGIOEDEMA. F/U with PCP in 1 week to assess need for further steroids/H1/H2 and possible Allergy consult. F/U with Ortho in 1 week    Condition at Discharge:  Stable    Hospital Course:   70 y. o. male with history of HTN, hypothyroidism, obesity who underwent [facility] L [facility] on 19 [facility] Dr [facility] with worsening rash. Failed outpatient Prednisone, Pepcid and Benadryl from ED. Was hypotensive at home and came to ER. Admitted as inpatient. Treated for angioedema and hypotension. Stopped ASA bid, Lisinopril (added angioedema as allergy) and HCTZ. On IV Steroids, IV Pepcid and IV Benadryl.   Seen by Ortho, recommend Eliquis X 14 days upon discussion with Dr Alatorre Alu office. Had resolution of rash and angioedema. Will finish Prednisone, Pepcid and Benadryl at home for 5 more days. Will complete Eliquis as recommended by Ortho. Suspect was rash was secondary to  mg bid, angioedema from Lisinopril. Lisinopril has been added as severe allergy. Kept off HCTZ. PCP should re-evaluate need for BP meds as outpatient before starting or resuming medications. Discharge Medications:   Discharge Medication List as of 5/12/2019  9:13 AM      START taking these medications    Details   apixaban (ELIQUIS) 2.5 MG TABS tablet Take 1 tablet by mouth 2 times daily for 12 days, Disp-24 tablet, R-0Print      clobetasol (TEMOVATE) 0.05 % cream Apply topically 2 times daily. , Disp-60 g, R-0, Print      famotidine (PEPCID) 20 MG tablet Take 1 tablet by mouth 2 times daily for 5 days, Disp-10 tablet, R-0Print           Discharge Medication List as of 5/12/2019  9:13 AM      CONTINUE these medications which have CHANGED    Details   diphenhydrAMINE (BENADRYL) 25 MG capsule Take 1 capsule by mouth every 6 hours for 5 days, Disp-20 capsule, R-0Print      predniSONE (DELTASONE) 50 MG tablet Take 1 tablet by mouth daily for 5 days, Disp-5 tablet, R-0Print           Discharge Medication List as of 5/12/2019  9:13 AM      CONTINUE these medications which have NOT CHANGED    Details   levothyroxine (SYNTHROID) 75 MCG tablet Take 75 mcg by mouth DailyHistorical Med           Discharge Medication List as of 5/12/2019  9:13 AM      STOP taking these medications       hydrochlorothiazide (HYDRODIURIL) 25 MG tablet Comments:   Reason for Stopping:         lisinopril (PRINIVIL;ZESTRIL) 40 MG tablet Comments:   Reason for Stopping:         aspirin 325 MG tablet Comments:   Reason for Stopping:         ondansetron (ZOFRAN ODT) 4 MG disintegrating tablet Comments:   Reason for Stopping:               Discharge Exam:    /69   Pulse 70 Temp 97.3 °F (36.3 °C) (Oral)   Resp 16   Ht 5' 11\" (1.803 m)   Wt 213 lb 0.4 oz (96.6 kg)   SpO2 96%   BMI 29.71 kg/m²   General appearance:  Appears comfortable. Well nourished, obese, pleasant  Eyes: Sclera clear, pupils equal  ENT: Moist mucus membranes, no thrush. Trachea midline. Left upper lip swelling resolving. No stridor  Cardiovascular: Regular rhythm, normal S1, S2. No murmur, gallop, rub. No edema in lower extremities  Respiratory: Clear to auscultation bilaterally, no wheeze, good inspiratory effort  Gastrointestinal: Abdomen soft, obese, non-tender, not distended, normal bowel sounds  Musculoskeletal: L TKA site without erythema, tenderness or swelling.  Has good ROM  Neurology: Grossly intact. Alert and oriented in time, place and person. No speech or motor deficits  Psychiatry: Appropriate affect. Not agitated  Skin: Annular rash noted on palmar surfaces improving.  Blanching rash on arms and legs resolved. Minimal rash on hands resolved. Brisk capillary refill, peripheral pulses palpable         Labs: For convenience and continuity at follow-up the following most recent labs are provided:    Lab Results   Component Value Date    WBC 9.4 05/12/2019    HGB 10.3 05/12/2019    HCT 29.6 05/12/2019    MCV 94.0 05/12/2019     05/12/2019     05/12/2019    K 4.3 05/12/2019     05/12/2019    CO2 22 05/12/2019    BUN 25 05/12/2019    CREATININE 0.7 05/12/2019    CALCIUM 8.6 05/12/2019    ALKPHOS 73 05/09/2019    ALT 19 05/09/2019    AST 19 05/09/2019    BILITOT 1.3 05/09/2019    LABALBU 3.6 05/09/2019     Lab Results   Component Value Date    INR 1.22 (H) 05/09/2019       Radiology:  Xr Chest Standard (2 Vw)    Result Date: 5/9/2019  EXAMINATION: TWO XRAY VIEWS OF THE CHEST 5/9/2019 1:00 pm COMPARISON: None.  HISTORY: ORDERING SYSTEM PROVIDED HISTORY: rash, low bp TECHNOLOGIST PROVIDED HISTORY: Reason for exam:->rash, low bp Ordering Physician Provided Reason for Exam: Rash, Low BP Acuity: Acute Type of Exam: Initial FINDINGS: The lungs are without acute focal process. There is no effusion or pneumothorax. The cardiomediastinal silhouette is without acute process. The osseous structures are without acute process. No acute process. Vl Extremity Venous Left    Result Date: 5/9/2019  Lower Extremities DVT Study  Demographics   Patient Name       Kleber Ovalles   Date of Study      05/09/2019         Gender              Male   Patient Number     [id number]         Date of Birth       1955   Visit Number       [id number]          Age                 70 year(s)   Accession Number   [id number]          Room Number         2513   Corporate ID       [id number]            Sonographer         Ronak Juares                                                            RVT, RDMS, AB,                                                            OB/GYN   Ordering Physician Magali Green    Interpreting        [facility]                     Sharla Land PA-C       Physician           Jairo Lowe MD,                                                            [facility]  Procedure Type of Study:   Veins:Lower Extremities DVT Study, VL EXTREMITY VENOUS DUPLEX LEFT. Vascular Sonographer Report  Additional Indications:left leg pain and swelling Impressions Left Impression No evidence of deep vein or superficial vein thrombosis involving the left lower extremity and the right common femoral vein. Conclusions   Summary   No evidence of deep vein or superficial vein thrombosis involving the left  lower extremity and the right common femoral vein. Signature   ------------------------------------------------------------------  Electronically signed by Jairo Lowe MD, [facility] (Interpreting  physician) on 05/09/2019 at 03:42 PM  ------------------------------------------------------------------  Patient Status:ER. [facility] - V[facility] Lab. Technical Quality:Adequate visualization. Velocities are measured in cm/s ; Diameters are measured in mm Right Lower Extremities DVT Study Measurements Right 2D Measurements +--------------+----------+---------------+----------+ ! Location      ! Visualized! Compressibility! Thrombosis! +--------------+----------+---------------+----------+ ! Common Femoral!Yes       ! Yes            ! None      ! +--------------+----------+---------------+----------+ Right Doppler Measurements +--------------+------+------+------------+ ! Location      ! Signal!Reflux! Reflux (sec)! +--------------+------+------+------------+ ! Common Femoral!Phasic!      !            ! +--------------+------+------+------------+ Left Lower Extremities DVT Study Measurements Left 2D Measurements +------------------------+----------+---------------+----------+ ! Location                ! Visualized! Compressibility! Thrombosis! +------------------------+----------+---------------+----------+ ! Sapheno Femoral Junction! Yes       ! Yes            ! None      ! +------------------------+----------+---------------+----------+ ! GSV Thigh               ! Yes       ! Yes            ! None      ! +------------------------+----------+---------------+----------+ ! Common Femoral          !Yes       ! Yes            ! None      ! +------------------------+----------+---------------+----------+ ! Prox Femoral            !Yes       ! Yes            ! None      ! +------------------------+----------+---------------+----------+ ! Mid Femoral             !Yes       ! Yes            ! None      ! +------------------------+----------+---------------+----------+ ! Dist Femoral            !Yes       ! Yes            ! None      ! +------------------------+----------+---------------+----------+ ! Deep Femoral            !Yes       ! Yes            ! None      ! +------------------------+----------+---------------+----------+ ! Popliteal               !Yes       ! Yes            ! None      ! discharge with patient, medication review, etc.       Signed:    Turner Robledo MD   5/12/2019      Thank you [address] for the opportunity to be involved in this patient's care.  If you have any questions or concerns please feel free to contact me at [address] [place] [address] no

## (undated) DEVICE — 2108 SERIES SAGITTAL BLADE (9.1 X 0.64 X 35.2MM)

## (undated) DEVICE — UNDERGLOVE SURG SZ 8.5 FNGR THK0.21MIL GRN LTX BEAD CUF

## (undated) DEVICE — TOWEL,STOP FLAG GOLD N-W: Brand: MEDLINE

## (undated) DEVICE — PADDING CAST N ADH 12X6 IN CRIMPED FINISH 100% COTTON WBRLII

## (undated) DEVICE — SYRINGE MED 30ML STD CLR PLAS LUERLOCK TIP N CTRL DISP

## (undated) DEVICE — SUTURE STRATAFIX SPRL SZ 1 L14IN ABSRB VLT L48CM CTX 1/2 SXPD2B405

## (undated) DEVICE — SST TWIST DRILL, STANDARD, 3.2MM DIA. X 127MM: Brand: MICROAIRE®

## (undated) DEVICE — STERILE SYNTHETIC POLYISOPRENE POWDER-FREE SURGICAL GLOVES WITH HYDROGEL COATING, SMOOTH FINISH, STRAIGHT FINGER: Brand: PROTEXIS

## (undated) DEVICE — BLADE SAW RECIP HVY DUTY LNG 27796327] STRYKER CORP]

## (undated) DEVICE — SUTURE VCRL SZ 0 L18IN ABSRB UD L36MM CT-1 1/2 CIR J840D

## (undated) DEVICE — DUAL CUT SAGITTAL BLADE

## (undated) DEVICE — 3M™ COBAN™ NL STERILE NON-LATEX SELF-ADHERENT WRAP, 2086S, 6 IN X 5 YD (15 CM X 4,5 M), 12 ROLLS/CASE: Brand: 3M™ COBAN™

## (undated) DEVICE — 3M™ TEGADERM™ TRANSPARENT FILM DRESSING FRAME STYLE, 1627, 4 IN X 10 IN (10 CM X 25 CM), 20/CT 4CT/CASE: Brand: 3M™ TEGADERM™

## (undated) DEVICE — SUTURE MCRYL + SZ 4-0 L18IN ABSRB UD L19MM PS-2 3/8 CIR MCP496G

## (undated) DEVICE — SUTURE VCRL SZ 1 L18IN ABSRB UD L36MM CT-1 1/2 CIR J841D

## (undated) DEVICE — SUTURE VCRL SZ 2-0 L18IN ABSRB UD CT-1 L36MM 1/2 CIR J839D

## (undated) DEVICE — 2108 SERIES SAGITTAL BLADE FAN, OFFSET  (29.0 X 0.89 X 73.0MM)

## (undated) DEVICE — BOWL AND CEMENT CARTRIDGE WITH BREAKAWAY FEMORAL NOZZLE AND MEDIUM PRESSURIZER: Brand: ACM

## (undated) DEVICE — COVER XR CASS W21XL40IN UNIV ADH MICROSHIELD

## (undated) DEVICE — CONTAINER,SPECIMEN,PNEU TUBE,3OZ,OR STRL: Brand: MEDLINE

## (undated) DEVICE — KNEE HOLDER DISPOSABLE LINER: Brand: ALVARADO®  KNEE SUPPORT

## (undated) DEVICE — COVER,TABLE,HEAVY DUTY,77"X90",STRL: Brand: MEDLINE

## (undated) DEVICE — SOLUTION IV IRRIG WATER 1000ML POUR BRL 2F7114

## (undated) DEVICE — BLANKET WRM W29.9XL79.1IN UP BODY FORC AIR MISTRAL-AIR

## (undated) DEVICE — APPLICATOR PREP 26ML 0.7% IOD POVACRYLEX 74% ISO ALC ST

## (undated) DEVICE — SYSTEM SKIN CLSR 22CM DERMBND PRINEO

## (undated) DEVICE — BIPOLAR SEALER 23-112-1 AQM 6.0: Brand: AQUAMANTYS ®

## (undated) DEVICE — GLOVE 6 LTX ST BIOGEL M PF BEAD CUFF

## (undated) DEVICE — GLOVE SURG SZ 65 L12IN FNGR THK79MIL GRN LTX FREE

## (undated) DEVICE — SST BUR, WIRE PASS DRILL, 2 FLUTES, MED., 2MM DIA.: Brand: MICROAIRE®

## (undated) DEVICE — STANDARD HYPODERMIC NEEDLE,POLYPROPYLENE HUB: Brand: MONOJECT

## (undated) DEVICE — TOTAL KNEE: Brand: MEDLINE INDUSTRIES, INC.

## (undated) DEVICE — ELECTRODE PT RET AD L9FT HI MOIST COND ADH HYDRGEL CORDED

## (undated) DEVICE — DRESSING THERABOND 3D ANTIMIC CNTCT SYS 15INCHX10INCH

## (undated) DEVICE — STERILE PVP: Brand: MEDLINE INDUSTRIES, INC.

## (undated) DEVICE — SOLUTION IV 1000ML 0.9% SOD CHL

## (undated) DEVICE — SOLUTION IV IRRIG 0.9% NACL 3000ML BAG 2B7477

## (undated) DEVICE — ZIMMER® STERILE DISPOSABLE TOURNIQUET CUFF WITH PLC, DUAL PORT, SINGLE BLADDER, 30 IN. (76 CM)